# Patient Record
Sex: FEMALE | Race: WHITE | Employment: OTHER | URBAN - METROPOLITAN AREA
[De-identification: names, ages, dates, MRNs, and addresses within clinical notes are randomized per-mention and may not be internally consistent; named-entity substitution may affect disease eponyms.]

---

## 2017-05-08 ENCOUNTER — TELEPHONE (OUTPATIENT)
Dept: BONE DENSITY | Facility: CLINIC | Age: 67
End: 2017-05-08

## 2017-05-15 ENCOUNTER — OFFICE VISIT (OUTPATIENT)
Dept: SLEEP MEDICINE | Facility: CLINIC | Age: 67
End: 2017-05-15
Payer: MEDICARE

## 2017-05-15 VITALS
SYSTOLIC BLOOD PRESSURE: 128 MMHG | BODY MASS INDEX: 18.78 KG/M2 | HEART RATE: 50 BPM | WEIGHT: 110 LBS | OXYGEN SATURATION: 100 % | TEMPERATURE: 98.6 F | DIASTOLIC BLOOD PRESSURE: 73 MMHG | HEIGHT: 64 IN

## 2017-05-15 DIAGNOSIS — G47.52 RBD (REM BEHAVIORAL DISORDER): Primary | ICD-10-CM

## 2017-05-15 PROCEDURE — 99213 OFFICE O/P EST LOW 20 MIN: CPT | Performed by: PSYCHIATRY & NEUROLOGY

## 2017-05-15 NOTE — PATIENT INSTRUCTIONS

## 2017-05-15 NOTE — NURSING NOTE
"Chief Complaint   Patient presents with     Sleep Problem     sleep return       Initial /73  Pulse 50  Temp 98.6  F (37  C) (Oral)  Ht 1.626 m (5' 4\")  Wt 49.9 kg (110 lb)  SpO2 100%  BMI 18.88 kg/m2 Estimated body mass index is 18.88 kg/(m^2) as calculated from the following:    Height as of this encounter: 1.626 m (5' 4\").    Weight as of this encounter: 49.9 kg (110 lb).  Medication Reconciliation: complete     ESS 2    Kristin Sullivan  Clinical Coordinator        "

## 2017-05-15 NOTE — PROGRESS NOTES
FOLLOWUP NOTE      REQUESTING PHYSICIAN:  None stated.        Nerissa Sun is 67 years old.  She has a history of REM sleep behavior disorder.  She is here for followup.  She continues to have only slight symptoms of hypopnea and constipation.  She has no difficulty with tremor, no handwriting changes, no balance issues.      In regards to her dream enactment behavior, 10 mg of melatonin has very nicely prevented her from having any significant dream enactment behavior that could be potentially injurious.  She recalls 1 episode where she awoke and she was dreaming about getting out of bed, but she did not do that.      On examination today, she demonstrated no parkinsonian features.  Her motor tone is normal.  Gait, including arm swing, stride and turn are normal and no motor tone abnormalities or tremor.      ASSESSMENT:  Ms. Sun is 67 years old.  She has a history of REM sleep behavior disorder, very likely triggered at least in part by antidepressant medications.  I think her likelihood of progressing to neurodegenerative syndrome in the near term is extremely unlikely based upon her normal neurologic exam and the association with her antidepressant medications but we will keep a close eye on her.  I have asked her to give us a call if she has any problems, she has been quite generous and has volunteered to participate in research trials for REM sleep behavior disorder and we will have her on our call list.  She will continue on the melatonin.      She is excited for this summer to going down to [facility] to visit with her 2 new grandchildren.        It is a great privilege being asked to participate in her care.  Fifteen minutes were spent with the patient today, greater than 50% of the time in counseling and coordination of care.      The patient should not operate a motor vehicle if she is tired or sleepy.         EULALIO ELIAS MD             D: 05/15/2017 09:54   T: 05/15/2017 10:19    MT: fatmata      Name:     FAUSTINO MARIA   MRN:      [id number]        Account:      [id number]   :      1950           Visit Date:   05/15/2017      Document: [id number]

## 2017-05-15 NOTE — MR AVS SNAPSHOT
After Visit Summary   5/15/2017    Nerissa Sun    MRN: [id number]           Patient Information     Date Of Birth          1950        Visit Information        Provider Department      5/15/2017 9:30 AM Deny Paulino MD [facility]        Today's Diagnoses     RBD (REM behavioral disorder)    -  1       Follow-ups after your visit        Your next 10 appointments already scheduled     Jun 26, 2017  8:30 AM CDT   MA SCREENING DIGITAL BILATERAL with [id number]   [facility] ([facility])    [address] [facility], [address]  [facility] [id number] [phone]           Do not use any powder, lotion or deodorant under your arms or on your breast. If you do, we will ask you to remove it before your exam.  Wear comfortable, two-piece clothing.  If you have any allergies, tell your care team.  Bring any previous mammograms from other facilities or have them mailed to the breast center. This mammogram location, [facility], now offers 3D mammography. It doesn't replace a screening mammogram and can be done with a regular screening mammogram. It is optional and not all insurances will pay for it. 3D mammography is a special kind of mammogram that produces a three-dimensional image of the breast by using low dose-xrays. 3D allows the radiologist to see the breast tissue differently from 2D, which reduces the chance of repeat testing due to overlapping breast tissue. If you are interested in have a 3D mammogram, please check with your insurance before you arrive for your exam. On the day of your exam you will be asked if you would like 3D imaging.            Jun 26, 2017  9:30 AM CDT   DX HIP/PELVIS/SPINE with RIDX1   [facility]    [address] [place]  [address]  [place] [address]              Please do not take any of the following 48 hours prior to your exam:  vitamins, calcium tablets, antacids.              Who to contact     If you have questions or need follow up information about today's clinic visit or your schedule please contact [facility] directly at [phone].  Normal or non-critical lab and imaging results will be communicated to you by MyChart, letter or phone within 4 business days after the clinic has received the results. If you do not hear from us within 7 days, please contact the clinic through MyChart or phone. If you have a critical or abnormal lab result, we will notify you by phone as soon as possible.  Submit refill requests through [employer] or call your pharmacy and they will forward the refill request to us. Please allow 3 business days for your refill to be completed.          Additional Information About Your Visit        [employer] Information     [employer] gives you secure access to your electronic health record. If you see a primary care provider, you can also send messages to your care team and make appointments. If you have questions, please call your primary care clinic.  If you do not have a primary care provider, please call [phone] and they will assist you.        Care EveryWhere ID     This is your Care EveryWhere ID. This could be used by other organizations to access your [place] medical records  [id number]         Blood Pressure from Last 3 Encounters:   06/21/16 104/63   01/12/15 116/72   01/09/15 117/79    Weight from Last 3 Encounters:   06/21/16 48.1 kg (106 lb)   01/12/15 48.5 kg (106 lb 14.4 oz)   11/25/14 48.1 kg (106 lb)              Today, you had the following     No orders found for display       Primary Care Provider Office Phone # Fax #    Savi You [phone]       [facility] [address]  [facility] [address]        Thank you!     Thank you for choosing [facility]  for your care. Our goal is always to provide you with  excellent care. Hearing back from our patients is one way we can continue to improve our services. Please take a few minutes to complete the written survey that you may receive in the mail after your visit with us. Thank you!             Your Updated Medication List - Protect others around you: Learn how to safely use, store and throw away your medicines at www.disposemymeds.org.          This list is accurate as of: 5/15/17  9:45 AM.  Always use your most recent med list.                   Brand Name Dispense Instructions for use    Acidophilus Tabs      Take 2 capsules by mouth daily. 10 billion in 2 capsules       buPROPion 150 MG 24 hr tablet    WELLBUTRIN XL     Take 450 mg by mouth every morning.       calcium 1200 [id number] MG-UNIT Chew     30 tablet    Take by mouth 2 times daily Liquid- with magnesium       cholecalciferol 5000 UNITS Caps capsule    vitamin D3     Take 1 capsule (5,000 Units) by mouth daily       famotidine 40 MG tablet    PEPCID    30 tablet    Take 1 tablet (40 mg) by mouth At Bedtime       levothyroxine 112 MCG tablet    SYNTHROID/LEVOTHROID     Take  by mouth daily.       LIOTHYRONINE SODIUM PO      Take 5 mcg by mouth 2 times daily.       LORazepam 1 MG tablet    ATIVAN    60 tablet    Take 1 tablet (1 mg) by mouth every 8 hours as needed for anxiety       magnesium citrate solution     296 mL    Drink 150mL PO once. If no BM in 8 hours, drink another 150mL PO       MELATONIN      1 mg At Bedtime       MIRALAX powder   Generic drug:  polyethylene glycol     510 g    Take 17 g by mouth daily       OMEGA-3 FISH OIL PO          sennosides 8.6 MG tablet    SENOKOT     Take 1 tablet by mouth daily

## 2017-06-26 ENCOUNTER — HOSPITAL ENCOUNTER (OUTPATIENT)
Dept: MAMMOGRAPHY | Facility: CLINIC | Age: 67
Discharge: HOME | End: 2017-06-26
Attending: INTERNAL MEDICINE | Admitting: INTERNAL MEDICINE
Payer: MEDICARE

## 2017-06-26 DIAGNOSIS — Z12.31 ENCOUNTER FOR SCREENING MAMMOGRAM FOR HIGH-RISK PATIENT: ICD-10-CM

## 2017-06-26 PROCEDURE — 77063 BREAST TOMOSYNTHESIS BI: CPT

## 2017-06-26 PROCEDURE — G0202 SCR MAMMO BI INCL CAD: HCPCS

## 2017-07-19 ENCOUNTER — RADIANT APPOINTMENT (OUTPATIENT)
Dept: BONE DENSITY | Facility: CLINIC | Age: 67
End: 2017-07-19
Payer: MEDICARE

## 2017-07-19 DIAGNOSIS — M81.0 OSTEOPOROSIS: ICD-10-CM

## 2017-07-19 DIAGNOSIS — Z78.0 POST-MENOPAUSE: ICD-10-CM

## 2017-07-19 PROCEDURE — 77080 DXA BONE DENSITY AXIAL: CPT | Performed by: INTERNAL MEDICINE

## 2017-08-01 ENCOUNTER — TELEPHONE (OUTPATIENT)
Dept: BONE DENSITY | Facility: CLINIC | Age: 67
End: 2017-08-01

## 2017-08-28 ENCOUNTER — TELEPHONE (OUTPATIENT)
Dept: SLEEP MEDICINE | Facility: CLINIC | Age: 67
End: 2017-08-28

## 2017-08-28 NOTE — TELEPHONE ENCOUNTER
Patient would like to speak with you, she live sin [place] and the soonest she can see you is the end of October. She has fallen out of bed twice, to the point they had to check if she had broken bones. She is taking 10 mg of melatonin every night but is very concerned about her situation. She hasn't fallen out of bed for years. She would like you to give her a call on her mobile number.    Yesica Moon

## 2017-08-28 NOTE — TELEPHONE ENCOUNTER
I called the patient twice and got no response.  Advised to go up to 15mg of melatonin and [facility].

## 2017-12-11 ENCOUNTER — HOSPITAL ENCOUNTER (EMERGENCY)
Facility: CLINIC | Age: 67
Discharge: HOME | End: 2017-12-11
Attending: EMERGENCY MEDICINE | Admitting: EMERGENCY MEDICINE
Payer: MEDICARE

## 2017-12-11 VITALS
RESPIRATION RATE: 16 BRPM | WEIGHT: 110 LBS | BODY MASS INDEX: 18.88 KG/M2 | HEART RATE: 75 BPM | OXYGEN SATURATION: 98 % | TEMPERATURE: 98.4 F | DIASTOLIC BLOOD PRESSURE: 94 MMHG | SYSTOLIC BLOOD PRESSURE: 160 MMHG

## 2017-12-11 DIAGNOSIS — F41.9 ANXIETY: ICD-10-CM

## 2017-12-11 DIAGNOSIS — R42 DIZZINESS: ICD-10-CM

## 2017-12-11 LAB
ANION GAP SERPL CALCULATED.3IONS-SCNC: 7 MMOL/L (ref 3–14)
BUN SERPL-MCNC: 28 MG/DL (ref 7–30)
CALCIUM SERPL-MCNC: 9.6 MG/DL (ref 8.5–10.1)
CHLORIDE SERPL-SCNC: 103 MMOL/L (ref 94–109)
CO2 SERPL-SCNC: 28 MMOL/L (ref 20–32)
CREAT SERPL-MCNC: 1.04 MG/DL (ref 0.52–1.04)
ERYTHROCYTE [DISTWIDTH] IN BLOOD BY AUTOMATED COUNT: 12.6 % (ref 10–15)
GFR SERPL CREATININE-BSD FRML MDRD: 53 ML/MIN/1.7M2
GLUCOSE BLDC GLUCOMTR-MCNC: 91 MG/DL (ref 70–99)
GLUCOSE SERPL-MCNC: 97 MG/DL (ref 70–99)
HCT VFR BLD AUTO: 41.6 % (ref 35–47)
HGB BLD-MCNC: 14 G/DL (ref 11.7–15.7)
MCH RBC QN AUTO: 33.9 PG (ref 26.5–33)
MCHC RBC AUTO-ENTMCNC: 33.7 G/DL (ref 31.5–36.5)
MCV RBC AUTO: 101 FL (ref 78–100)
PLATELET # BLD AUTO: 153 10E9/L (ref 150–450)
POTASSIUM SERPL-SCNC: 3.4 MMOL/L (ref 3.4–5.3)
RBC # BLD AUTO: 4.13 10E12/L (ref 3.8–5.2)
SODIUM SERPL-SCNC: 138 MMOL/L (ref 133–144)
T4 FREE SERPL-MCNC: 0.93 NG/DL (ref 0.76–1.46)
TSH SERPL DL<=0.005 MIU/L-ACNC: 0.39 MU/L (ref 0.4–4)
WBC # BLD AUTO: 5.4 10E9/L (ref 4–11)

## 2017-12-11 PROCEDURE — 93005 ELECTROCARDIOGRAM TRACING: CPT

## 2017-12-11 PROCEDURE — 00000146 ZZHCL STATISTIC GLUCOSE BY METER IP

## 2017-12-11 PROCEDURE — 85027 COMPLETE CBC AUTOMATED: CPT | Performed by: EMERGENCY MEDICINE

## 2017-12-11 PROCEDURE — 99284 EMERGENCY DEPT VISIT MOD MDM: CPT

## 2017-12-11 PROCEDURE — 84443 ASSAY THYROID STIM HORMONE: CPT | Performed by: EMERGENCY MEDICINE

## 2017-12-11 PROCEDURE — 80048 BASIC METABOLIC PNL TOTAL CA: CPT | Performed by: EMERGENCY MEDICINE

## 2017-12-11 PROCEDURE — 84439 ASSAY OF FREE THYROXINE: CPT | Performed by: EMERGENCY MEDICINE

## 2017-12-11 ASSESSMENT — ENCOUNTER SYMPTOMS
NERVOUS/ANXIOUS: 1
NAUSEA: 1
TREMORS: 1
ABDOMINAL PAIN: 0

## 2017-12-11 NOTE — ED PROVIDER NOTES
"  History     Chief Complaint:  Tremors, Confusion    HPI   Nerissa Sun is a 67 year old female with a history of anxiety who presents with bilateral hand tremors and confusion. The patient reports having a history of episodes of nausea, bilateral hand shaking, head pulsing, and an inability to \"think straight\"; she states having 4 of these episodes in the last 2 months. She is from [place] and saw a neurologist who attributed those episodes to anxiety. Today, she reports going to mental health therapy this morning and taking a wrong turn. She was able to navigate back, go to therapy, and then on her way missed her exit for [address]. At this point, around 1330, the patient noted bilateral hand shaking and head pulsing, which is normal to her previous history. She states that she drove to urgent care for further evaluation and presents to the ED for stroke concerns. She notes no pain and that her hand tremors have subsided. The patient reports recently starting Lyrica. She additionally mentions that her blood pressure is normally very low and is concerned about her high blood pressure here in the ED.    Allergies:  Melatonin  Latex      Medications:    Pepcid  Ativan  Levothyroxine  Wellbutrin  Liothyronine   Lyrica    Past Medical History:    Basal cell carcinoma of face  Chronic pain  Dysthymia  ETOH abuse  Fibromyalgia  GERD  Hypothyroid  PONV  Spondylitis    Past Surgical History:    Appendectomy   Carpal tunnel release bilateral  Colonoscopy  DNC  Left wrist fracture  Tubal pregnancy  Hysterectomy  Tympanomastoidectomy  Rotator cuff repair  Tympanoplasty    Family History:    Cancer - mother  MI - mother  Thyroid - maternal grandmother, maternal aunt, brother, mother  Alcohol/Drug - father  Cancer - maternal grandfather, mother, maternal grandmother  Cerebrovascular - maternal aunt    Social History:  Smoking status: former  Alcohol use: no   PCP: Savi You   Patient presents alone.   Marital Status:  "       Review of Systems   Cardiovascular: Negative for chest pain.   Gastrointestinal: Positive for nausea. Negative for abdominal pain.   Neurological: Positive for tremors.   Psychiatric/Behavioral: The patient is nervous/anxious.    All other systems reviewed and are negative.    Physical Exam     Patient Vitals for the past 24 hrs:   BP Temp Temp src Pulse Heart Rate Resp SpO2 Weight   12/11/17 1630 144/84 - - - 61 - 100 % -   12/11/17 1615 147/79 - - - 60 - 100 % -   12/11/17 1600 158/84 - - - 63 - 100 % -   12/11/17 1554 - 98.4  F (36.9  C) Oral - - - 100 % 49.9 kg (110 lb)   12/11/17 1549 (!) 160/95 - - 75 - 16 - -        Physical Exam    General:   Pleasant, age appropriate.  HEENT:    Oropharynx is moist, without lesions or trismus.  Eyes:    Conjunctiva normal     PERRL     EOMs and visual fields intact.  Neck:    Supple, no meningismus.     CV:     Regular rate and rhythm.      No murmurs, rubs or gallops.       No JVD or unilateral leg swelling.       2+ radial pulses bilateral.       No lower extremity edema.  PULM:    Clear to auscultation bilateral.       No respiratory distress.      Good air exchange.     No rales or wheezing.     No stridor.  ABD:    Soft, non-tender, non-distended.       No pulsatile masses.       No rebound, guarding or rigidity.  MSK:     No gross deformity to all four extremities.   LYMPH:   No cervical lymphadenopathy.  NEURO:   Alert & oriented x 3.      CN II-XII intact, speech is clear with no aphasia.       Finger to nose within normal limits.  No pronator drift.       Strength is 5/5 in all 4 extremities.  Sensation is intact.       Normal muscular tone, no tremor.     Good muscle tone, no atrophy.  Skin:    Warm, dry and intact.    Psych:    Mood is good and affect is appropriate.     Mildly anxious        Emergency Department Course   ECG (15:43):  Rate 69 bpm. ND interval 130. QT/QTc 382/409.   Normal sinus rhythm. Possible left atrial enlargement. Borderline  ECG.  Interpreted at 1554 by Reynaldo Shah MD.      Laboratory:  CBC: WNL (WBC 5.4, HGB 14.0, )   BMP: GFR 53 (L), o/w WNL (Creatinine 1.04)  TSH with free T4 reflex: 0.39 (L)  T4 Free: 0.93  Glucose: 91    Emergency Department Course:  Past medical records, nursing notes, and vitals reviewed.  1546: I performed an exam of the patient and obtained history, as documented above. GCS 15.   IV inserted and blood drawn.   1703: I rechecked the patient. Findings and plan explained to the Patient. Patient discharged home with instructions regarding supportive care, medications, and reasons to return. The importance of close follow-up was reviewed.      Impression & Plan      Medical Decision Making:  Nerissa Sun is a 67 year old female presenting to the ED with transient confusion and bilateral upper extremity tremors. History and evaluation is not consistent with any acute intracranial process, especially given her bilateral, symmetric upper extremity symptoms. She has no focal neurological deficits on examination. Basic laboratory studies are unremarkable. She does have a history of anxiety and evaluation by neurologist prior to today that attributed her symptoms to anxiety. I feel that her recurrent symptoms are most consistent with anxiety. There is no suggestion of TIA or acute intracranial event. It less likely represents transient arrhythmia or endocrinopathy. Patient is safe for discharge home with close follow up with primary care physician.    Diagnosis:    ICD-10-CM    1. Dizziness R42    2. Anxiety F41.9        Disposition:  discharged to home    Aimee Hickman  12/11/2017   [facility] EMERGENCY DEPARTMENT  I, Aimee Hickman, am serving as a scribe at 3:46 PM on 12/11/2017 to document services personally performed by Reynaldo Shah MD based on my observations and the provider's statements to me.        Reynaldo Shah MD  12/11/17 [id number]

## 2017-12-11 NOTE — DISCHARGE INSTRUCTIONS
Discharge Instructions  Dizziness (Lightheaded)  Today you were seen for dizziness.  Dizziness can be caused by many things and it can be very difficult to determine the cause of dizziness.  At this time, your provider has found no signs that your dizziness is due to a serious or life-threatening condition. However, sometimes there is a serious problem that does not show up right away, and it is important for you to follow up with your regular provider as instructed.  Generally, every Emergency Department visit should have a follow-up clinic visit with either a primary or a specialty clinic/provider. Please follow-up as instructed by your emergency provider today.      Return to the Emergency Department if:      You pass out (fainting or falling out), especially during exercise.      You develop chest pain, chest pressure or difficulty breathing.    Your feel an irregular heartbeat.    You have excessive vaginal bleeding, or blood in your stool or vomit (throw up).    You have a high fever.    Your symptoms get worse or more frequent.    If when you begin to feel dizzy or lightheaded, it is important to sit down or lay down immediately to prevent injury from falling.  If you were given a prescription for medicine here today, be sure to read all of the information (including the package insert) that comes with your prescription.  This will include important information about the medicine, its side effects, and any warnings that you need to know about.  The pharmacist who fills the prescription can provide more information and answer questions you may have about the medicine.  If you have questions or concerns that the pharmacist cannot address, please call or return to the Emergency Department.   Remember that you can always come back to the Emergency Department if you are not able to see your regular provider in the amount of time listed above, if you get any new symptoms, or if there is anything that worries  you.

## 2017-12-11 NOTE — ED AVS SNAPSHOT
[facility] Emergency Department    [address]    [facility] [address]    Phone:  [phone]    Fax:  [phone]                                       Nerissa Sun   MRN: [id number]    Department:  [facility] Emergency Department   Date of Visit:  12/11/2017           After Visit Summary Signature Page     I have received my discharge instructions, and my questions have been answered. I have discussed any challenges I see with this plan with the nurse or doctor.    ..........................................................................................................................................  Patient/Patient Representative Signature      ..........................................................................................................................................  Patient Representative Print Name and Relationship to Patient    ..................................................               ................................................  Date                                            Time    ..........................................................................................................................................  Reviewed by Signature/Title    ...................................................              ..............................................  Date                                                            Time

## 2017-12-11 NOTE — ED AVS SNAPSHOT
[facility] Emergency Department    [address] [place] [address]    Phone:  [phone]    Fax:  [phone]                                       Nerissa Sun   MRN: [id number]    Department:  [facility] Emergency Department   Date of Visit:  12/11/2017           Patient Information     Date Of Birth          1950        Your diagnoses for this visit were:     Dizziness     Anxiety        You were seen by Reynaldo Shah MD.      Follow-up Information     Follow up with Cyndi Rodriguez NP.    Specialty:  Nurse Practitioner    Contact information:    [facility]  [address]  [place] [id number]  [phone]          Follow up with Cyndi Rodriguez NP In 3 days.    Specialty:  Nurse Practitioner    Contact information:    [facility]  [address] [place] [id number]  [phone]          Discharge Instructions       Discharge Instructions  Dizziness (Lightheaded)  Today you were seen for dizziness.  Dizziness can be caused by many things and it can be very difficult to determine the cause of dizziness.  At this time, your provider has found no signs that your dizziness is due to a serious or life-threatening condition. However, sometimes there is a serious problem that does not show up right away, and it is important for you to follow up with your regular provider as instructed.  Generally, every Emergency Department visit should have a follow-up clinic visit with either a primary or a specialty clinic/provider. Please follow-up as instructed by your emergency provider today.      Return to the Emergency Department if:      You pass out (fainting or falling out), especially during exercise.      You develop chest pain, chest pressure or difficulty breathing.    Your feel an irregular heartbeat.    You have excessive vaginal bleeding, or blood in your stool or vomit (throw up).    You have a high fever.    Your symptoms get worse or  more frequent.    If when you begin to feel dizzy or lightheaded, it is important to sit down or lay down immediately to prevent injury from falling.  If you were given a prescription for medicine here today, be sure to read all of the information (including the package insert) that comes with your prescription.  This will include important information about the medicine, its side effects, and any warnings that you need to know about.  The pharmacist who fills the prescription can provide more information and answer questions you may have about the medicine.  If you have questions or concerns that the pharmacist cannot address, please call or return to the Emergency Department.   Remember that you can always come back to the Emergency Department if you are not able to see your regular provider in the amount of time listed above, if you get any new symptoms, or if there is anything that worries you.      Discharge References/Attachments     ANXIETY DISORDERS, UNDERSTANDING (ENGLISH)      24 Hour Appointment Hotline       To make an appointment at any [facility], call [phone] ([phone]). If you don't have a family doctor or clinic, we will help you find one. [facility] are conveniently located to serve the needs of you and your family.             Review of your medicines      Our records show that you are taking the medicines listed below. If these are incorrect, please call your family doctor or clinic.        Dose / Directions Last dose taken    Acidophilus Tabs   Dose:  2 capsule        Take 2 capsules by mouth daily. 10 billion in 2 capsules   Refills:  0        buPROPion 150 MG 24 hr tablet   Commonly known as:  WELLBUTRIN XL   Dose:  450 mg        Take 450 mg by mouth every morning.   Refills:  0        calcium 1200 8252-3172 MG-UNIT Chew   Quantity:  30 tablet        Take by mouth 2 times daily Liquid- with magnesium   Refills:  0        cholecalciferol 5000 UNITS Caps capsule    Commonly known as:  vitamin D3   Dose:  5000 Units        Take 1 capsule (5,000 Units) by mouth daily   Refills:  0        famotidine 40 MG tablet   Commonly known as:  PEPCID   Dose:  40 mg   Quantity:  30 tablet        Take 1 tablet (40 mg) by mouth At Bedtime   Refills:  1        levothyroxine 112 MCG tablet   Commonly known as:  SYNTHROID/LEVOTHROID        Take  by mouth daily.   Refills:  0        LIOTHYRONINE SODIUM PO   Dose:  5 mcg        Take 5 mcg by mouth 2 times daily.   Refills:  0        LORazepam 1 MG tablet   Commonly known as:  ATIVAN   Dose:  1 mg   Quantity:  60 tablet        Take 1 tablet (1 mg) by mouth every 8 hours as needed for anxiety   Refills:  0        LYRICA PO        Refills:  0        magnesium citrate solution   Quantity:  296 mL        Drink 150mL PO once. If no BM in 8 hours, drink another 150mL PO   Refills:  0        MELATONIN   Dose:  1 mg        1 mg At Bedtime   Refills:  0        MIRALAX powder   Dose:  1 capful   Quantity:  510 g   Generic drug:  polyethylene glycol        Take 17 g by mouth daily   Refills:  1        OMEGA-3 FISH OIL PO        Refills:  0        sennosides 8.6 MG tablet   Commonly known as:  SENOKOT   Dose:  1 tablet        Take 1 tablet by mouth daily   Refills:  0                Procedures and tests performed during your visit     Basic metabolic panel (BMP)    CBC (platelets, no diff)    EKG 12 lead    Glucose by meter    Peripheral IV catheter    T4 free    TSH with free T4 reflex      Orders Needing Specimen Collection     None      Pending Results     Date and Time Order Name Status Description    12/11/2017 1609 EKG 12 lead Preliminary             Pending Culture Results     No orders found from 12/9/2017 to 12/12/2017.            Pending Results Instructions     If you had any lab results that were not finalized at the time of your Discharge, you can call the ED Lab Result RN at [phone]. You will be contacted by this team for any positive Lab  results or changes in treatment. The nurses are available 7 days a week from 10A to 6:30P.  You can leave a message 24 hours per day and they will return your call.        Test Results From Your Hospital Stay        12/11/2017  4:04 PM      Component Results     Component Value Ref Range & Units Status    WBC 5.4 4.0 - 11.0 10e9/L Final    RBC Count 4.13 3.8 - 5.2 10e12/L Final    Hemoglobin 14.0 11.7 - 15.7 g/dL Final    Hematocrit 41.6 35.0 - 47.0 % Final     (H) 78 - 100 fl Final    MCH 33.9 (H) 26.5 - 33.0 pg Final    MCHC 33.7 31.5 - 36.5 g/dL Final    RDW 12.6 10.0 - 15.0 % Final    Platelet Count 153 150 - 450 10e9/L Final         12/11/2017  4:26 PM      Component Results     Component Value Ref Range & Units Status    Sodium 138 133 - 144 mmol/L Final    Potassium 3.4 3.4 - 5.3 mmol/L Final    Chloride 103 94 - 109 mmol/L Final    Carbon Dioxide 28 20 - 32 mmol/L Final    Anion Gap 7 3 - 14 mmol/L Final    Glucose 97 70 - 99 mg/dL Final    Urea Nitrogen 28 7 - 30 mg/dL Final    Creatinine 1.04 0.52 - 1.04 mg/dL Final    GFR Estimate 53 (L) >60 mL/min/1.7m2 Final    Non  GFR Calc    GFR Estimate If Black 64 >60 mL/min/1.7m2 Final    African American GFR Calc    Calcium 9.6 8.5 - 10.1 mg/dL Final         12/11/2017  4:30 PM      Component Results     Component Value Ref Range & Units Status    TSH 0.39 (L) 0.40 - 4.00 mU/L Final         12/11/2017  3:57 PM      Component Results     Component Value Ref Range & Units Status    Glucose 91 70 - 99 mg/dL Final         12/11/2017  4:49 PM      Component Results     Component Value Ref Range & Units Status    T4 Free 0.93 0.76 - 1.46 ng/dL Final                Clinical Quality Measure: Blood Pressure Screening     Your blood pressure was checked while you were in the emergency department today. The last reading we obtained was  BP: 144/84 . Please read the guidelines below about what these numbers mean and what you should do about them.  If  your systolic blood pressure (the top number) is less than 120 and your diastolic blood pressure (the bottom number) is less than 80, then your blood pressure is normal. There is nothing more that you need to do about it.  If your systolic blood pressure (the top number) is 120-139 or your diastolic blood pressure (the bottom number) is 80-89, your blood pressure may be higher than it should be. You should have your blood pressure rechecked within a year by a primary care provider.  If your systolic blood pressure (the top number) is 140 or greater or your diastolic blood pressure (the bottom number) is 90 or greater, you may have high blood pressure. High blood pressure is treatable, but if left untreated over time it can put you at risk for heart attack, stroke, or kidney failure. You should have your blood pressure rechecked by a primary care provider within the next 4 weeks.  If your provider in the emergency department today gave you specific instructions to follow-up with your doctor or provider even sooner than that, you should follow that instruction and not wait for up to 4 weeks for your follow-up visit.        Thank you for choosing [facility]       Thank you for choosing [facility] for your care. Our goal is always to provide you with excellent care. Hearing back from our patients is one way we can continue to improve our services. Please take a few minutes to complete the written survey that you may receive in the mail after you visit with us. Thank you!        [employer] Information     [employer] gives you secure access to your electronic health record. If you see a primary care provider, you can also send messages to your care team and make appointments. If you have questions, please call your primary care clinic.  If you do not have a primary care provider, please call [phone] and they will assist you.        Care EveryWhere ID     This is your Care EveryWhere ID. This could be used by other  organizations to access your [place] medical records  [id number]        Equal Access to Services     DWAYNE WILLIS : Gabriel Gilman, sakshi sarah, lial thayer. So [facility] [phone].    ATENCIÓN: Si habla español, tiene a jensen disposición servicios gratuitos de asistencia lingüística. Llame al [phone].    We comply with applicable federal civil rights laws and [place] laws. We do not discriminate on the basis of race, color, national origin, age, disability, sex, sexual orientation, or gender identity.            After Visit Summary       This is your record. Keep this with you and show to your community pharmacist(s) and doctor(s) at your next visit.

## 2017-12-12 ENCOUNTER — TELEPHONE (OUTPATIENT)
Dept: SLEEP MEDICINE | Facility: CLINIC | Age: 67
End: 2017-12-12

## 2017-12-12 LAB — INTERPRETATION ECG - MUSE: NORMAL

## 2017-12-12 NOTE — TELEPHONE ENCOUNTER
Left message for patient to contact Sleep Clinic if she would like to schedule an appointment with Dr. Paulino. He does have an appointment opening for 12/13/17 at 9am.

## 2017-12-12 NOTE — TELEPHONE ENCOUNTER
Patient is in town this week and is looking to see if patient can be seen by Dr. Paulino sometime this week and if not, if patient can have a phone consultation. There is a 9:00 appt available tomorrow 12/13/2017; however per patient has another appt tomorrow. The patient was last seen by Dr. Paulino 11/24/2016. Please contact the patient in regards to scheduling appointment.    Outreach ,  Loreta Correia

## 2017-12-13 ENCOUNTER — OFFICE VISIT (OUTPATIENT)
Dept: SLEEP MEDICINE | Facility: CLINIC | Age: 67
End: 2017-12-13
Payer: MEDICARE

## 2017-12-13 VITALS
HEART RATE: 67 BPM | DIASTOLIC BLOOD PRESSURE: 66 MMHG | WEIGHT: 102 LBS | SYSTOLIC BLOOD PRESSURE: 104 MMHG | HEIGHT: 65 IN | BODY MASS INDEX: 17 KG/M2 | RESPIRATION RATE: 14 BRPM | OXYGEN SATURATION: 98 %

## 2017-12-13 DIAGNOSIS — G47.52 RBD (REM BEHAVIORAL DISORDER): Primary | ICD-10-CM

## 2017-12-13 PROCEDURE — 99214 OFFICE O/P EST MOD 30 MIN: CPT | Performed by: PSYCHIATRY & NEUROLOGY

## 2017-12-13 RX ORDER — DIAZEPAM 5 MG
5 TABLET ORAL
COMMUNITY
Start: 2017-01-29 | End: 2017-12-22

## 2017-12-13 RX ORDER — MULTIVIT WITH MINERALS/LUTEIN
1000 TABLET ORAL DAILY
COMMUNITY
Start: 2017-01-29

## 2017-12-13 RX ORDER — SUCRALFATE 1 G/1
1 TABLET ORAL 3 TIMES DAILY
COMMUNITY

## 2017-12-13 NOTE — PATIENT INSTRUCTIONS
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
## 2017-12-13 NOTE — MR AVS SNAPSHOT
After Visit Summary   12/13/2017    Nerissa Sun    MRN: [id number]           Patient Information     Date Of Birth          1950        Visit Information        Provider Department      12/13/2017 9:00 AM Deny Paulino MD [facility] Instructions      Your BMI is Body mass index is 17.24 kg/(m^2).  Weight management is a personal decision.  If you are interested in exploring weight loss strategies, the following discussion covers the approaches that may be successful. Body mass index (BMI) is one way to tell whether you are at a healthy weight, overweight, or obese. It measures your weight in relation to your height.  A BMI of 18.5 to 24.9 is in the healthy range. A person with a BMI of 25 to 29.9 is considered overweight, and someone with a BMI of 30 or greater is considered obese. More than two-thirds of American adults are considered overweight or obese.  Being overweight or obese increases the risk for further weight gain. Excess weight may lead to heart disease and diabetes.  Creating and following plans for healthy eating and physical activity may help you improve your health.  Weight control is part of healthy lifestyle and includes exercise, emotional health, and healthy eating habits. Careful eating habits lifelong are the mainstay of weight control. Though there are significant health benefits from weight loss, long-term weight loss with diet alone may be very difficult to achieve- studies show long-term success with dietary management in less than 10% of people. Attaining a healthy weight may be especially difficult to achieve in those with severe obesity. In some cases, medications, devices and surgical management might be considered.  What can you do?  If you are overweight or obese and are interested in methods for weight loss, you should discuss this with your provider.     Consider reducing daily calorie intake by 500 calories.     Keep  a food journal.     Avoiding skipping meals, consider cutting portions instead.    Diet combined with exercise helps maintain muscle while optimizing fat loss. Strength training is particularly important for building and maintaining muscle mass. Exercise helps reduce stress, increase energy, and improves fitness. Increasing exercise without diet control, however, may not burn enough calories to loose weight.       Start walking three days a week 10-20 minutes at a time    Work towards walking thirty minutes five days a week     Eventually, increase the speed of your walking for 1-2 minutes at time    In addition, we recommend that you review healthy lifestyles and methods for weight loss available through the National Institutes of Health patient information sites:  http://win.niddk.nih.gov/publications/index.htm    And look into health and wellness programs that may be available through your health insurance provider, employer, local community center, or jorge club.                  Follow-ups after your visit        Your next 10 appointments already scheduled     Feb 14, 2018 11:00 AM CST   Return Sleep Patient with Deny Paulino MD   [facility] ([facility]    [address] [place] [id number] [phone]              Who to contact     If you have questions or need follow up information about today's clinic visit or your schedule please contact [facility] directly at [phone].  Normal or non-critical lab and imaging results will be communicated to you by MyChart, letter or phone within 4 business days after the clinic has received the results. If you do not hear from us within 7 days, please contact the clinic through MyChart or phone. If you have a critical or abnormal lab result, we will notify you by phone as soon as possible.  Submit refill requests through [employer] or call your pharmacy and they will forward the  "refill request to us. Please allow 3 business days for your refill to be completed.          Additional Information About Your Visit        [employer] Information     [employer] gives you secure access to your electronic health record. If you see a primary care provider, you can also send messages to your care team and make appointments. If you have questions, please call your primary care clinic.  If you do not have a primary care provider, please call [phone] and they will assist you.        Care EveryWhere ID     This is your Care EveryWhere ID. This could be used by other organizations to access your [place] medical records  [id number]        Your Vitals Were     Pulse Respirations Height Pulse Oximetry BMI (Body Mass Index)       67 14 1.638 m (5' 4.5\") 98% 17.24 kg/m2        Blood Pressure from Last 3 Encounters:   12/13/17 104/66   12/11/17 (!) 160/94   05/15/17 128/73    Weight from Last 3 Encounters:   12/13/17 46.3 kg (102 lb)   12/11/17 49.9 kg (110 lb)   05/15/17 49.9 kg (110 lb)              Today, you had the following     No orders found for display         Today's Medication Changes          These changes are accurate as of: 12/13/17  9:49 AM.  If you have any questions, ask your nurse or doctor.               Stop taking these medicines if you haven't already. Please contact your care team if you have questions.     famotidine 40 MG tablet   Commonly known as:  PEPCID           LORazepam 1 MG tablet   Commonly known as:  ATIVAN           LYRICA PO           sennosides 8.6 MG tablet   Commonly known as:  SENOKOT                    Primary Care Provider Office Phone # Fax #    Cyndi F French Rodriguez [phone]       [facility] [address] [facility] [address]        Equal Access to Services     GUILLERMO WILLIS : Gabriel Gilman, sakshi sarah, qainder noguera, lila borrero. So [facility] [phone].    ATENCIÓN: Si tom espmarv, " tiene a jensen disposición servicios gratuitos de asistencia lingüística. Leoncio dunlap [phone].    We comply with applicable federal civil rights laws and [place] laws. We do not discriminate on the basis of race, color, national origin, age, disability, sex, sexual orientation, or gender identity.            Thank you!     Thank you for choosing [facility]  for your care. Our goal is always to provide you with excellent care. Hearing back from our patients is one way we can continue to improve our services. Please take a few minutes to complete the written survey that you may receive in the mail after your visit with us. Thank you!             Your Updated Medication List - Protect others around you: Learn how to safely use, store and throw away your medicines at www.disposemymeds.org.          This list is accurate as of: 12/13/17  9:49 AM.  Always use your most recent med list.                   Brand Name Dispense Instructions for use Diagnosis    Acidophilus Tabs      Take 2 capsules by mouth daily. 10 billion in 2 capsules    Hypothyroid, Osteoporosis with pathological fracture, Osteoporosis, postmenopausal       ascorbic acid 1000 MG Tabs    vitamin C     Take 1,000 mg by mouth        buPROPion 150 MG 24 hr tablet    WELLBUTRIN XL     Take 450 mg by mouth every morning.        calcium 1200 8402-9505 MG-UNIT Chew     30 tablet    Take by mouth 2 times daily Liquid- with magnesium        cholecalciferol 5000 UNITS Caps capsule    vitamin D3     Take 1 capsule (5,000 Units) by mouth daily        COLLAGEN PO           diazepam 5 MG tablet    VALIUM     Take 5 mg by mouth        levothyroxine 112 MCG tablet    SYNTHROID/LEVOTHROID     Take  by mouth daily.        LIOTHYRONINE SODIUM PO      Take 5 mcg by mouth 2 times daily.        magnesium citrate solution     296 mL    Drink 150mL PO once. If no BM in 8 hours, drink another 150mL PO        MELATONIN      1 mg At Bedtime        MIRALAX powder    Generic drug:  polyethylene glycol     510 g    Take 17 g by mouth daily        OMEGA-3 FISH OIL PO           sucralfate 1 GM tablet    CARAFATE     Take by mouth as needed

## 2017-12-13 NOTE — NURSING NOTE
"Chief Complaint   Patient presents with     Sleep Problem     acting out dreams       Initial /66  Pulse 67  Resp 14  Ht 1.638 m (5' 4.5\")  Wt 46.3 kg (102 lb)  SpO2 98%  BMI 17.24 kg/m2 Estimated body mass index is 17.24 kg/(m^2) as calculated from the following:    Height as of this encounter: 1.638 m (5' 4.5\").    Weight as of this encounter: 46.3 kg (102 lb).  Medication Reconciliation: complete   ESS 2  Dafne Regan MA      "

## 2017-12-22 ENCOUNTER — APPOINTMENT (OUTPATIENT)
Dept: CT IMAGING | Facility: CLINIC | Age: 67
End: 2017-12-22
Attending: EMERGENCY MEDICINE
Payer: MEDICARE

## 2017-12-22 ENCOUNTER — TELEPHONE (OUTPATIENT)
Dept: SLEEP MEDICINE | Facility: CLINIC | Age: 67
End: 2017-12-22

## 2017-12-22 ENCOUNTER — HOSPITAL ENCOUNTER (EMERGENCY)
Facility: CLINIC | Age: 67
Discharge: HOME | End: 2017-12-22
Attending: EMERGENCY MEDICINE | Admitting: EMERGENCY MEDICINE
Payer: MEDICARE

## 2017-12-22 ENCOUNTER — TELEPHONE (OUTPATIENT)
Dept: NEUROSURGERY | Facility: CLINIC | Age: 67
End: 2017-12-22

## 2017-12-22 VITALS
OXYGEN SATURATION: 95 % | HEART RATE: 57 BPM | DIASTOLIC BLOOD PRESSURE: 88 MMHG | RESPIRATION RATE: 16 BRPM | SYSTOLIC BLOOD PRESSURE: 158 MMHG | TEMPERATURE: 97.7 F

## 2017-12-22 DIAGNOSIS — G47.52 REM SLEEP BEHAVIOR DISORDER: Primary | ICD-10-CM

## 2017-12-22 DIAGNOSIS — S22.050A CLOSED WEDGE COMPRESSION FRACTURE OF SIXTH THORACIC VERTEBRA, INITIAL ENCOUNTER: ICD-10-CM

## 2017-12-22 DIAGNOSIS — S22.050A: Primary | ICD-10-CM

## 2017-12-22 PROCEDURE — A9270 NON-COVERED ITEM OR SERVICE: HCPCS | Mod: GY | Performed by: EMERGENCY MEDICINE

## 2017-12-22 PROCEDURE — 72128 CT CHEST SPINE W/O DYE: CPT

## 2017-12-22 PROCEDURE — 99284 EMERGENCY DEPT VISIT MOD MDM: CPT | Mod: 25

## 2017-12-22 PROCEDURE — 25000132 ZZH RX MED GY IP 250 OP 250 PS 637: Mod: GY | Performed by: EMERGENCY MEDICINE

## 2017-12-22 RX ORDER — METHOCARBAMOL 750 MG/1
750 TABLET, FILM COATED ORAL ONCE
Status: COMPLETED | OUTPATIENT
Start: 2017-12-22 | End: 2017-12-22

## 2017-12-22 RX ORDER — CLONAZEPAM 0.5 MG/1
0.5 TABLET ORAL AT BEDTIME
Qty: 30 TABLET | Refills: 5 | Status: SHIPPED | OUTPATIENT
Start: 2017-12-22 | End: 2018-03-28

## 2017-12-22 RX ORDER — METHOCARBAMOL 750 MG/1
750 TABLET, FILM COATED ORAL 4 TIMES DAILY PRN
Qty: 20 TABLET | Refills: 0 | Status: SHIPPED | OUTPATIENT
Start: 2017-12-22 | End: 2018-12-05

## 2017-12-22 RX ORDER — TRAMADOL HYDROCHLORIDE 50 MG/1
50 TABLET ORAL ONCE
Status: COMPLETED | OUTPATIENT
Start: 2017-12-22 | End: 2017-12-22

## 2017-12-22 RX ORDER — TRAMADOL HYDROCHLORIDE 50 MG/1
50 TABLET ORAL EVERY 6 HOURS PRN
Qty: 15 TABLET | Refills: 0 | Status: SHIPPED | OUTPATIENT
Start: 2017-12-22 | End: 2018-03-28

## 2017-12-22 RX ADMIN — METHOCARBAMOL 750 MG: 750 TABLET ORAL at 04:28

## 2017-12-22 RX ADMIN — TRAMADOL HYDROCHLORIDE 50 MG: 50 TABLET, COATED ORAL at 03:52

## 2017-12-22 ASSESSMENT — ENCOUNTER SYMPTOMS
HEADACHES: 0
BACK PAIN: 1
NECK PAIN: 0

## 2017-12-22 NOTE — LETTER
December 22, 2017      To Whom It May Concern:      Nerissa Sun was seen in our Emergency Department today, 12/22/17.  She will need to be excused from flying 12/26/2017 secondary to medical reasons.     Sincerely,        Jaylen Latif MD

## 2017-12-22 NOTE — ED AVS SNAPSHOT
[facility] Emergency Department    [address]    [facility] [address]    Phone:  [phone]    Fax:  [phone]                                       Nerissa Sun   MRN: [id number]    Department:  [facility] Emergency Department   Date of Visit:  12/22/2017           After Visit Summary Signature Page     I have received my discharge instructions, and my questions have been answered. I have discussed any challenges I see with this plan with the nurse or doctor.    ..........................................................................................................................................  Patient/Patient Representative Signature      ..........................................................................................................................................  Patient Representative Print Name and Relationship to Patient    ..................................................               ................................................  Date                                            Time    ..........................................................................................................................................  Reviewed by Signature/Title    ...................................................              ..............................................  Date                                                            Time

## 2017-12-22 NOTE — ED NOTES
Pt presents to ED via EMS after falling out of bed while dreaming. Pt fell out of bed about 4 weeks ago and has hairline fx on low back from it. Hx osteoporosis. EMS stated bed was about 4ft tall. ABCs intact. A&Ox3.

## 2017-12-22 NOTE — DISCHARGE INSTRUCTIONS
Vertebral Compression Fracture    You have a compression fracture or break in one of the bones in your spine. This kind of break usually happens in older people with thinning of the bones called osteoporosis. It may happen after a ground level fall or even with a very minor force. This can include bending forward, getting up from a seated position, coughing, or sneezing.  It may also occur in young healthy people after a severe trauma, such as a car accident or fall from a height. This is generally a stable break and usually does not cause any injury to the spinal cord or nerves. This injury usually takes 1 to 3 months to heal. It can be treated at home with bed rest and pain medicine.  Prescription or over-the-counter pain medicine can be used to control the pain. Long-term use of pain medicine can increase the risk of side effects. This includes: liver or kidney damage, GI bleeding, constipation, or narcotic dependence. If you have chronic liver or kidney disease, or ever had a stomach ulcer or GI bleeding, talk with your healthcare provider before using these medicines. If pain medicine is needed for more than 1 to 2 weeks, talk with your healthcare provider about other treatment options.  A back brace or abdominal binder may be prescribed to reduce pain by limiting motion at the site of the break. If you have osteoporosis, talk with your healthcare provider about using calcium and vitamin D supplements. You may need prescription medicines to prevent further bone loss. An exercise program to strengthen spine strength is a very important part of the treatment plan. It should begin once the pain is under control.  If you have severe or persistent pain, your healthcare provider may recommend a procedure called a vertebral augmentation. In this procedure, a needle is used to inject a bone cement into the broken vertebra. This will expand it  to its original shape.  Home care    You may need to stay in  bed for the first few days. But, begin sitting or walking as soon as possible. This will help you avoid problems with prolonged bed rest such as: muscle weakness, worsening back stiffness and pain, and blood clots in the legs.    When in bed, try to find a comfortable position. A firm mattress is best. Try lying flat on your back with pillows under your knees. You can also try lying on your side with your knees bent up towards your chest and a pillow between your knees.    Avoid sitting for long periods of time. This puts more stress on the lower back than standing or walking.    Apply an ice pack over the injured area for 15 to 20 minutes every 3 to 6 hours. You should do this for the first 24 to 48 hours. You can make an ice pack by filling a plastic bag that seals at the top with ice cubes and then wrapping it with a thin towel. You can start with ice, then switch to heat after two days. Apply heat (warm shower or warm bath) for 15 to 20 minutes several times a day for muscle spasms. Some patients feel best alternating ice and heat treatments. Use the one method that feels the best to you. Be careful not to injure your skin with the ice or heat treatments. Ice should never be applied directly to skin. Warm rather than hot heat should be used to protect skin areas that have decreased sensation.    Take pain medicine as directed. Call your healthcare provider if your pain is not well-controlled. A dose change, stronger medicine, or other treatment options may be needed.    Be aware of safe lifting methods and do not lift anything over 10 pounds until all the pain is gone.  Follow-up care  Follow up with your healthcare provider, or as advised. If X-rays were taken, you will be told of any new findings that may affect your care.  Call 911  Call 911 if you have:    Weakness or numbness in one or both legs    Loss of control over bowels or bladder    Numbness in the groin area  When to seek medical advice  Call your  healthcare provider right away if the pain gets worse or spreads to your arms or legs.  Date Last Reviewed: 11/23/2015 2000-2017 [employer]. [address] [facility], [place] [address]. All rights reserved. This information is not intended as a substitute for professional medical care. Always follow your healthcare professional's instructions.

## 2017-12-22 NOTE — ED PROVIDER NOTES
"  History     Chief Complaint:  Fall    HPI   Nerissa Sun is a 67 year old female with REM sleep behavior disorder, spondylolisthesis, fibromyalgia, osteoporosis, who presents by EMS following a fall. She has had recent falls, last one 4 weeks ago which caused a hairline fracture on low back.    Patient reports she fell of a fairly tall bed when acting out a dream. No LOC.  Patient thinks she might have hit the back of her head, but here she denies headache. Her only complain is mid thoracic back pain, denies neck pain. Patient lied on the floor \"for a bit\" and EMS assisted her to get up. She did ambulate for EMS.    Allergies:  Latex      Medications:    Diazepam  Sucralfate  Miralax  Calcium carbonate - Vit D   Liothyronine    Bupropion  Levothyroxine     Past Medical History:    Facial BCC  Dysthymia  Alcohol abuse  Fibromyalgia  GERD  Hypothyroidism   PONV   Osteoporosis  Lumbar radiculopathy   Spondylolisthesis  IBS   Vertigo     Past Surgical History:    Appendectomy  Bilateral carpal tunnel release  Cataract surgery  Surgery for ectopic pregnancy  D&C x 2   Wrist fracture repair  Total hysterectomy  Tympanomastoidectomy with Laser PCO2  Left rotator cuff repair    Family History:    Lung cancer  MI  Thyroid disease  Alcohol/drug abuse  Breast cancer  Throat cancer    Social History:  Marital Status:    Smoking status: former, quit 1990  Alcohol status: negative  Patient presents by EMS from private residence. She is accompanied by friends.  PCP: Cyndi Rodriguez      Review of Systems   Musculoskeletal: Positive for back pain. Negative for neck pain.   Neurological: Negative for headaches.   All other systems reviewed and are negative.    Physical Exam     Patient Vitals for the past 24 hrs:   BP Temp Temp src Pulse Heart Rate Resp SpO2   12/22/17 0500 - - - - - - 96 %   12/22/17 0400 158/88 - - - - - 97 %   12/22/17 0330 154/86 97.7  F (36.5  C) Oral 57 57 16 99 %      Physical Exam  Vital " signs and nursing notes reviewed.     Constitutional: laying on gurney appears comfortable  HENT: Oropharynx is clear and moist, no scalp hematoma  Eyes: Conjunctivae are normal bilaterally. Pupils equal  Neck: normal range of motion, no midline tenderness  Cardiovascular: Normal rate, regular rhythm, normal heart sounds.   Pulmonary/Chest: Effort normal and breath sounds normal. No respiratory distress.   Abdominal: Soft. Bowel sounds are normal. No tenderness to palpation. No rebound or guarding.   Musculoskeletal: no extremity injury noted, no rib tenderness.  Point tenderness at midline of mid thoracic spine, no lumbar tenderness  Neurological: Alert and oriented. No focal weakness, no confusion  Skin: Skin is warm and dry. No rash noted.   Psych: normal affect  Emergency Department Course     Imaging:  Radiology findings were communicated with the patient who voiced understanding of the findings.    Thoracic spine CT, without contrast, per radiology:    1. Moderate compression fracture of the inferior endplate of the T6 vertebral body, likely acute.  2. No other findings suspicious for acute fracture of the thoracic spine.  3. Normal alignment of the thoracic spine.    Interventions:  0352: Tramadol 50 mg, PO  0428: Methocarbamol 750 mg, PO      Emergency Department Course:  Nursing notes and vitals reviewed.  I performed an exam of the patient as documented above.     CT T-spine obtained while in the emergency department, findings above.      0525, discussed case with Jaylen Sanchez PA-C, Spine surgery.    I discussed the findings and treatment plan with the patient. She expressed understanding of this plan and consented to discharge. She will be discharged home with instructions for care and follow up. In addition, the patient will return to the emergency department if their symptoms persist, worsen, if new symptoms arise or if there is any concern.  All questions were answered.     Impression & Plan       Medical Decision Making:  Nerissa Sun is a 67 year old female who presents after sustaining a fall out of bed tonight. She had no other apparent injury on exam or reported pain other than at the midthoracic area. CT imaging was obtained that showed a probable acute moderate wedge compression fracture. There is no evidence of fragments and she has a normal neurovascular exam. After medication for pain she is able to ambulate without assistance, though she still has some discomfort. I discussed the case with on-call for [facility] for follow-up. They said they'll be able to follow-up with her today to set her up with an orthotic to assist with discomfort. We gave her a walker to help with the discomfort as well and medication for pain. This patient has no other injuries and her pain is well tolerated at this time, I felt she was safe for discharge home. Patient was given referral information and discharged in good condition.    Diagnosis:    ICD-10-CM   1. Closed wedge compression fracture of sixth thoracic vertebra, initial encounter (H) S22.050A     Disposition:   Discharge     Discharge Medications:  New Prescriptions    METHOCARBAMOL (ROBAXIN) 750 MG TABLET    Take 1 tablet (750 mg) by mouth 4 times daily as needed for muscle spasms    TRAMADOL (ULTRAM) 50 MG TABLET    Take 1 tablet (50 mg) by mouth every 6 hours as needed for moderate pain     Scribe Disclosure:  BRAYAN, Zohreh Ballard, am serving as a scribe at 3:26 AM on 12/22/2017 to document services personally performed by Jaylen Latif MD, based on my observations and the provider's statements to me.    [facility] EMERGENCY DEPARTMENT       Jaylen Latif MD  12/22/17 3893

## 2017-12-22 NOTE — LETTER
December 22, 2017      To Whom It May Concern:      Nerissa Sun was seen in our Emergency Department today, 12/22/17.  Due to medical reasons Mrs. Sun will not be able to fly due to medical reasons on 12/26/2018.     Sincerely,    Jaylen Latif MD

## 2017-12-22 NOTE — TELEPHONE ENCOUNTER
REASON FOR CALL:  Patient was seen in the ED recently and the ED doctor consulted with [facility]. Patient called this morning stating she needs to be fitted for a brace. [facility] does not have an order for a brace on this patient. Please jone [facility] know if they need to schedule her for a fitting.     Detailed message can be left: yes

## 2017-12-22 NOTE — TELEPHONE ENCOUNTER
Spoke with Jaylen Sanchez PA-C, and he recommends to order eveline brace through [facility]. Placed order. Patient notified.

## 2017-12-22 NOTE — TELEPHONE ENCOUNTER
Renae Sullivan rec'd call from patient.  In ER this AM due to EBENEZER causing vertebral fracture.  Dr. Paulino and fellow had previously discussed switching patient to Clonazepam from Diazepam.  Given EBENEZER event last night, it seems change is appropriate.    Will order Clonazepam.  Patient is tiny so will start with:  - Clonazepam 0.5 mg HS.    - Stop Diazepam  - Increase melatonin to 15 - 18 mg if not already taking that dose (unless she has previously had significant sedation at higher melatonin doses).  - She needs to start sleeping in sleeping bag as recommended by Dr. Paulino and fellow.    Recommend patient call in late next week to let us know how things are going and we can decide on further management.    Tommy Riojas MD, Sleep Physician  Dec 22, 2017

## 2017-12-22 NOTE — TELEPHONE ENCOUNTER
Called prescription for Clonazepam 0.5 tabs with 5 refills to [facility] in [place] on [address]. Called patient to notify her.    Kristin Sullivan  Sleep Clinic - Specialist

## 2017-12-22 NOTE — ED AVS SNAPSHOT
[facility]    [address]    [facility] [address]    Phone:  [phone]    Fax:  [phone]                                       Nerissa Sun   MRN: [id number]    Department:  [facility]   Date of Visit:  12/22/2017           Patient Information     Date Of Birth          1950        Your diagnoses for this visit were:     Closed wedge compression fracture of sixth thoracic vertebra, initial encounter (H)        You were seen by Tyra Richardson MD and Jaylen Latif MD.      Follow-up Information     Follow up with [id number] ([facility]).    Why:  call today after 830 am to set up for brace fitting    Contact information:    [address] [place] [address]  [facility] [address] [phone]        Discharge Instructions         Vertebral Compression Fracture    You have a compression fracture or break in one of the bones in your spine. This kind of break usually happens in older people with thinning of the bones called osteoporosis. It may happen after a ground level fall or even with a very minor force. This can include bending forward, getting up from a seated position, coughing, or sneezing.  It may also occur in young healthy people after a severe trauma, such as a car accident or fall from a height. This is generally a stable break and usually does not cause any injury to the spinal cord or nerves. This injury usually takes 1 to 3 months to heal. It can be treated at home with bed rest and pain medicine.  Prescription or over-the-counter pain medicine can be used to control the pain. Long-term use of pain medicine can increase the risk of side effects. This includes: liver or kidney damage, GI bleeding, constipation, or narcotic dependence. If you have chronic liver or kidney disease, or ever had a stomach ulcer or GI bleeding, talk with your healthcare provider before using these  medicines. If pain medicine is needed for more than 1 to 2 weeks, talk with your healthcare provider about other treatment options.  A back brace or abdominal binder may be prescribed to reduce pain by limiting motion at the site of the break. If you have osteoporosis, talk with your healthcare provider about using calcium and vitamin D supplements. You may need prescription medicines to prevent further bone loss. An exercise program to strengthen spine strength is a very important part of the treatment plan. It should begin once the pain is under control.  If you have severe or persistent pain, your healthcare provider may recommend a procedure called a vertebral augmentation. In this procedure, a needle is used to inject a bone cement into the broken vertebra. This will expand it  to its original shape.  Home care    You may need to stay in bed for the first few days. But, begin sitting or walking as soon as possible. This will help you avoid problems with prolonged bed rest such as: muscle weakness, worsening back stiffness and pain, and blood clots in the legs.    When in bed, try to find a comfortable position. A firm mattress is best. Try lying flat on your back with pillows under your knees. You can also try lying on your side with your knees bent up towards your chest and a pillow between your knees.    Avoid sitting for long periods of time. This puts more stress on the lower back than standing or walking.    Apply an ice pack over the injured area for 15 to 20 minutes every 3 to 6 hours. You should do this for the first 24 to 48 hours. You can make an ice pack by filling a plastic bag that seals at the top with ice cubes and then wrapping it with a thin towel. You can start with ice, then switch to heat after two days. Apply heat (warm shower or warm bath) for 15 to 20 minutes several times a day for muscle spasms. Some patients feel best alternating ice and heat treatments. Use the one method  that feels the best to you. Be careful not to injure your skin with the ice or heat treatments. Ice should never be applied directly to skin. Warm rather than hot heat should be used to protect skin areas that have decreased sensation.    Take pain medicine as directed. Call your healthcare provider if your pain is not well-controlled. A dose change, stronger medicine, or other treatment options may be needed.    Be aware of safe lifting methods and do not lift anything over 10 pounds until all the pain is gone.  Follow-up care  Follow up with your healthcare provider, or as advised. If X-rays were taken, you will be told of any new findings that may affect your care.  Call 911  Call 911 if you have:    Weakness or numbness in one or both legs    Loss of control over bowels or bladder    Numbness in the groin area  When to seek medical advice  Call your healthcare provider right away if the pain gets worse or spreads to your arms or legs.  Date Last Reviewed: 11/23/2015 2000-2017 [employer]. [address] [place] [address]. All rights reserved. This information is not intended as a substitute for professional medical care. Always follow your healthcare professional's instructions.          Future Appointments        Provider Department Dept Phone Center    2/14/2018 11:00 AM Deny Paulino MD [facility] [place] [phone] [facility]      24 Hour Appointment Hotline       To make an appointment at any [facility], call [phone] ([phone]). If you don't have a family doctor or clinic, we will help you find one. [facility] clinics are conveniently located to serve the needs of you and your family.             Review of your medicines      START taking        Dose / Directions Last dose taken    methocarbamol 750 MG tablet   Commonly known as:  ROBAXIN   Dose:  750 mg   Quantity:  20 tablet        Take 1 tablet (750 mg) by mouth 4 times daily as needed for  muscle spasms   Refills:  0        traMADol 50 MG tablet   Commonly known as:  ULTRAM   Dose:  50 mg   Quantity:  15 tablet        Take 1 tablet (50 mg) by mouth every 6 hours as needed for moderate pain   Refills:  0          Our records show that you are taking the medicines listed below. If these are incorrect, please call your family doctor or clinic.        Dose / Directions Last dose taken    Acidophilus Tabs   Dose:  2 capsule        Take 2 capsules by mouth daily. 10 billion in 2 capsules   Refills:  0        ascorbic acid 1000 MG Tabs   Commonly known as:  vitamin C   Dose:  1000 mg        Take 1,000 mg by mouth   Refills:  0        buPROPion 150 MG 24 hr tablet   Commonly known as:  WELLBUTRIN XL   Dose:  450 mg        Take 450 mg by mouth every morning.   Refills:  0        calcium 1200 4995-6325 MG-UNIT Chew   Quantity:  30 tablet        Take by mouth 2 times daily Liquid- with magnesium   Refills:  0        cholecalciferol 5000 UNITS Caps capsule   Commonly known as:  vitamin D3   Dose:  5000 Units        Take 1 capsule (5,000 Units) by mouth daily   Refills:  0        COLLAGEN PO        Refills:  0        diazepam 5 MG tablet   Commonly known as:  VALIUM   Dose:  5 mg        Take 5 mg by mouth   Refills:  0        levothyroxine 112 MCG tablet   Commonly known as:  SYNTHROID/LEVOTHROID        Take  by mouth daily.   Refills:  0        LIOTHYRONINE SODIUM PO   Dose:  5 mcg        Take 5 mcg by mouth 2 times daily.   Refills:  0        magnesium citrate solution   Quantity:  296 mL        Drink 150mL PO once. If no BM in 8 hours, drink another 150mL PO   Refills:  0        MELATONIN   Dose:  1 mg        1 mg At Bedtime   Refills:  0        MIRALAX powder   Dose:  1 capful   Quantity:  510 g   Generic drug:  polyethylene glycol        Take 17 g by mouth daily   Refills:  1        OMEGA-3 FISH OIL PO        Refills:  0        sucralfate 1 GM tablet   Commonly known as:  CARAFATE        Take by mouth as  needed   Refills:  0                Prescriptions were sent or printed at these locations (2 Prescriptions)                   Other Prescriptions                Printed at Department/Unit printer (2 of 2)         traMADol (ULTRAM) 50 MG tablet               methocarbamol (ROBAXIN) 750 MG tablet                Procedures and tests performed during your visit     CT Thoracic Spine w/o Contrast      Orders Needing Specimen Collection     None      Pending Results     No orders found from 12/20/2017 to 12/23/2017.            Pending Culture Results     No orders found from 12/20/2017 to 12/23/2017.            Pending Results Instructions     If you had any lab results that were not finalized at the time of your Discharge, you can call the ED Lab Result RN at [phone]. You will be contacted by this team for any positive Lab results or changes in treatment. The nurses are available 7 days a week from 10A to 6:30P.  You can leave a message 24 hours per day and they will return your call.        Test Results From Your Hospital Stay        12/22/2017  4:50 AM      Narrative     CT THORACIC SPINE WITHOUT CONTRAST  12/22/2017 4:22 AM     HISTORY: Trauma. Pain.    COMPARISON: 1/9/2015 - CT abdomen and pelvis.    TECHNIQUE: Without intravenous contrast, helical sections were  acquired through the thoracic spine. Coronal and sagittal  reconstructions were generated. Radiation dose for this scan was  reduced using automated exposure control, adjustment of the mA and/or  kV according to the patient's size, or iterative reconstruction  technique.    FINDINGS: Normal alignment of the thoracic spine. Moderate compression  deformity of the inferior endplate of the T6 vertebral body, likely  acute. No other findings suspicious for fracture of the thoracic  spine. Several endplate irregularities are present within the thoracic  spine, likely representing Schmorl's nodes. Atherosclerotic  calcification in the thoracic aorta.         Impression     IMPRESSION:  1. Moderate compression fracture of the inferior endplate of the T6  vertebral body, likely acute.  2. No other findings suspicious for acute fracture of the thoracic  spine.  3. Normal alignment of the thoracic spine.    FLAVIO GARCIA MD                Clinical Quality Measure: Blood Pressure Screening     Your blood pressure was checked while you were in the emergency department today. The last reading we obtained was  BP: 158/88 . Please read the guidelines below about what these numbers mean and what you should do about them.  If your systolic blood pressure (the top number) is less than 120 and your diastolic blood pressure (the bottom number) is less than 80, then your blood pressure is normal. There is nothing more that you need to do about it.  If your systolic blood pressure (the top number) is 120-139 or your diastolic blood pressure (the bottom number) is 80-89, your blood pressure may be higher than it should be. You should have your blood pressure rechecked within a year by a primary care provider.  If your systolic blood pressure (the top number) is 140 or greater or your diastolic blood pressure (the bottom number) is 90 or greater, you may have high blood pressure. High blood pressure is treatable, but if left untreated over time it can put you at risk for heart attack, stroke, or kidney failure. You should have your blood pressure rechecked by a primary care provider within the next 4 weeks.  If your provider in the emergency department today gave you specific instructions to follow-up with your doctor or provider even sooner than that, you should follow that instruction and not wait for up to 4 weeks for your follow-up visit.        Thank you for choosing [facility]       Thank you for choosing [facility] for your care. Our goal is always to provide you with excellent care. Hearing back from our patients is one way we can continue to improve our services. Please take a  few minutes to complete the written survey that you may receive in the mail after you visit with us. Thank you!        [employer] Information     [employer] gives you secure access to your electronic health record. If you see a primary care provider, you can also send messages to your care team and make appointments. If you have questions, please call your primary care clinic.  If you do not have a primary care provider, please call [phone] and they will assist you.        Care EveryWhere ID     This is your Care EveryWhere ID. This could be used by other organizations to access your [facility] medical records  [id number]        Equal Access to Services     [facility] : Gabriel Gilman, sakshi sarah, lila thayer. So [facility] [phone].    ATENCIÓN: Si habla español, tiene a jensen disposición servicios gratuitos de asistencia lingüística. Kalpeshame al [phone].    We comply with applicable federal civil rights laws and [place] laws. We do not discriminate on the basis of race, color, national origin, age, disability, sex, sexual orientation, or gender identity.            After Visit Summary       This is your record. Keep this with you and show to your community pharmacist(s) and doctor(s) at your next visit.

## 2017-12-23 NOTE — ED NOTES
Patient called department twice on the morning of 12/23/17 asking for refill of pain medication. Writer returned patient's call at 0950 and patient stated to writer that she was told not to drive for 15 days, so she is unable to get to her primary care physician due to that. I reviewed the patient's AVS with her and their is no instruction in the AVS regarding inability to drive for 15 days. Advised patient to try and get in to her primary, and return to ED if needed.

## 2018-01-03 ENCOUNTER — NURSE TRIAGE (OUTPATIENT)
Dept: NURSING | Facility: CLINIC | Age: 68
End: 2018-01-03

## 2018-01-03 ENCOUNTER — TELEPHONE (OUTPATIENT)
Dept: SLEEP MEDICINE | Facility: CLINIC | Age: 68
End: 2018-01-03

## 2018-01-03 NOTE — TELEPHONE ENCOUNTER
Nerissa had a fracture in back and was prescribed Clonazepam .5mg.  Nerissa is having  symptoms of a panic attack and tongue is thicker.  Nerissa is requesting to get off this medication and is requesting  to speak with clinic direct.

## 2018-01-03 NOTE — TELEPHONE ENCOUNTER
Patient is calling she is having suicidal thoughts, fear, crying, shaking, tongue feels to be thick and she is having difficulty talking. Patient has been taking clonazepam and thinks this medication is causing these symptoms and would like to stop taking, calling for advice.

## 2018-01-03 NOTE — TELEPHONE ENCOUNTER
I called to discuss with the patient her urgent concerns.      Very challenging: recent breakthrough dream enactment resulted in vertebral fracture.  Started on clonazepam but is now more depressed likely due to the clonazepam but also she describes fearfulness which may very well be the fact she is no longer on diazepam during the day for anxiety.      After extensive discussion we came to the following conclusions.     -stop clonazepam now.  -immediately discuss with psychiatrist her anxiety including suicidal thoughts.  She contracted for safety with me.  Would reach out if thoughts returned or worsened.   -will continue on higher doses of melatonin (now 20 mg) will continue to use sleeping bag, recently got bed rails and will sleep with mattress on the floor.    -Will keep us updated.

## 2018-03-28 ENCOUNTER — OFFICE VISIT (OUTPATIENT)
Dept: SLEEP MEDICINE | Facility: CLINIC | Age: 68
End: 2018-03-28
Payer: MEDICARE

## 2018-03-28 VITALS
HEART RATE: 57 BPM | OXYGEN SATURATION: 99 % | DIASTOLIC BLOOD PRESSURE: 70 MMHG | WEIGHT: 106 LBS | RESPIRATION RATE: 14 BRPM | BODY MASS INDEX: 17.66 KG/M2 | HEIGHT: 65 IN | SYSTOLIC BLOOD PRESSURE: 115 MMHG

## 2018-03-28 DIAGNOSIS — G47.52 RBD (REM BEHAVIORAL DISORDER): Primary | ICD-10-CM

## 2018-03-28 PROCEDURE — 99214 OFFICE O/P EST MOD 30 MIN: CPT | Performed by: PSYCHIATRY & NEUROLOGY

## 2018-03-28 NOTE — PATIENT INSTRUCTIONS

## 2018-03-28 NOTE — PROGRESS NOTES
Patient returns to discuss challenging dream enactment behavior, sleep related injury, risk for neurodegeneration.      Modestly improved living situation, less stress, thus less dream enactment and vivid dreams. She is sleeping in the sleeping bag, off of escitalopram, taking much less melatonin (currently none), diazepam and this seems to be working well.     I gave her permission to go back up on melatonin 3-6-9mg as needed and she indicated she would do so.  Discussed the importance of never driving if tired or sleepy, no weapons in the bedroom and she indicated she understands.      Of note clonazepam resulted in exacerbation of her mood depression.  We will hold off on that in the future unless necessary.  I did mention to her, as she is interested in better controlling her mood without more meds, neuromodulation with Dr Silvestre.  She was interested in learning more I gave her his contact info and she will look into it.    No current issues of anosmia, constipation, tremor, hand writing changes, gait or balance issues. I reassured her that since her dream enactment is pretty clearly related to her medication her neurodegeneration risk is low.       All questions were answered.    It is a great privilege being asked to participate in this patients care.     25 minutes were spent with this patient greater than 50% in counsiling and coordination of care.

## 2018-03-28 NOTE — NURSING NOTE
"Chief Complaint   Patient presents with     Sleep Problem       Initial /70  Pulse 57  Resp 14  Ht 1.638 m (5' 4.5\")  Wt 48.1 kg (106 lb)  SpO2 99%  BMI 17.91 kg/m2 Estimated body mass index is 17.91 kg/(m^2) as calculated from the following:    Height as of this encounter: 1.638 m (5' 4.5\").    Weight as of this encounter: 48.1 kg (106 lb).  Medication Reconciliation: complete     ESS 2  Cherri Samuels    "

## 2018-03-28 NOTE — MR AVS SNAPSHOT
After Visit Summary   3/28/2018    Nerissa Sun    MRN: [id number]           Patient Information     Date Of Birth          1950        Visit Information        Provider Department      3/28/2018 11:00 AM Deny Paulino MD [facility]        Today's Diagnoses     RBD (REM behavioral disorder)    -  1      Care Instructions      Your BMI is Body mass index is 17.91 kg/(m^2).  Weight management is a personal decision.  If you are interested in exploring weight loss strategies, the following discussion covers the approaches that may be successful. Body mass index (BMI) is one way to tell whether you are at a healthy weight, overweight, or obese. It measures your weight in relation to your height.  A BMI of 18.5 to 24.9 is in the healthy range. A person with a BMI of 25 to 29.9 is considered overweight, and someone with a BMI of 30 or greater is considered obese. More than two-thirds of American adults are considered overweight or obese.  Being overweight or obese increases the risk for further weight gain. Excess weight may lead to heart disease and diabetes.  Creating and following plans for healthy eating and physical activity may help you improve your health.  Weight control is part of healthy lifestyle and includes exercise, emotional health, and healthy eating habits. Careful eating habits lifelong are the mainstay of weight control. Though there are significant health benefits from weight loss, long-term weight loss with diet alone may be very difficult to achieve- studies show long-term success with dietary management in less than 10% of people. Attaining a healthy weight may be especially difficult to achieve in those with severe obesity. In some cases, medications, devices and surgical management might be considered.  What can you do?  If you are overweight or obese and are interested in methods for weight loss, you should discuss this with your provider.      Consider reducing daily calorie intake by 500 calories.     Keep a food journal.     Avoiding skipping meals, consider cutting portions instead.    Diet combined with exercise helps maintain muscle while optimizing fat loss. Strength training is particularly important for building and maintaining muscle mass. Exercise helps reduce stress, increase energy, and improves fitness. Increasing exercise without diet control, however, may not burn enough calories to loose weight.       Start walking three days a week 10-20 minutes at a time    Work towards walking thirty minutes five days a week     Eventually, increase the speed of your walking for 1-2 minutes at time    In addition, we recommend that you review healthy lifestyles and methods for weight loss available through the National Institutes of Health patient information sites:  http://win.niddk.nih.gov/publications/index.htm    And look into health and wellness programs that may be available through your health insurance provider, employer, local community center, or jorge club.    Weight management plan Patient was referred to their PCP to discuss a diet and exercise plan.            Follow-ups after your visit        Your next 10 appointments already scheduled     Mar 20, 2019 11:00 AM CDT   Return Sleep Patient with eDny Paulino MD   [facility] ([facility])    [address] [address] [phone]              Who to contact     If you have questions or need follow up information about today's clinic visit or your schedule please contact [facility] directly at [phone].  Normal or non-critical lab and imaging results will be communicated to you by MyChart, letter or phone within 4 business days after the clinic has received the results. If you do not hear from us within 7 days, please contact the clinic through MyChart or phone. If you have a critical or  "abnormal lab result, we will notify you by phone as soon as possible.  Submit refill requests through [employer] or call your pharmacy and they will forward the refill request to us. Please allow 3 business days for your refill to be completed.          Additional Information About Your Visit        MyChart Information     [employer] gives you secure access to your electronic health record. If you see a primary care provider, you can also send messages to your care team and make appointments. If you have questions, please call your primary care clinic.  If you do not have a primary care provider, please call [phone] and they will assist you.        Care EveryWhere ID     This is your Care EveryWhere ID. This could be used by other organizations to access your [place] medical records  [id number]        Your Vitals Were     Pulse Respirations Height Pulse Oximetry BMI (Body Mass Index)       57 14 1.638 m (5' 4.5\") 99% 17.91 kg/m2        Blood Pressure from Last 3 Encounters:   03/28/18 115/70   12/22/17 158/88   12/13/17 104/66    Weight from Last 3 Encounters:   03/28/18 48.1 kg (106 lb)   12/13/17 46.3 kg (102 lb)   12/11/17 49.9 kg (110 lb)              Today, you had the following     No orders found for display       Primary Care Provider Office Phone # Fax #    Cyndi RAPP French Rodriguez [phone]       [address] [address]        Equal Access to Services     GUILLERMO WILLIS AH: Hadii aad ku rianao Sovivienali, waaxda luqadaha, qaybta kaalmada adeegyada, lila borrero. So [facility] [phone].    ATENCIÓN: Si habla español, tiene a jensen disposición servicios gratuitos de asistencia lingüística. Llame al [phone].    We comply with applicable federal civil rights laws and [place] laws. We do not discriminate on the basis of race, color, national origin, age, disability, sex, sexual orientation, or gender identity.            Thank you!     Thank you " for choosing [facility]  for your care. Our goal is always to provide you with excellent care. Hearing back from our patients is one way we can continue to improve our services. Please take a few minutes to complete the written survey that you may receive in the mail after your visit with us. Thank you!             Your Updated Medication List - Protect others around you: Learn how to safely use, store and throw away your medicines at www.disposemymeds.org.          This list is accurate as of 3/28/18 12:02 PM.  Always use your most recent med list.                   Brand Name Dispense Instructions for use Diagnosis    Acidophilus Tabs      Take 2 capsules by mouth daily. 10 billion in 2 capsules    Hypothyroid, Osteoporosis with pathological fracture, Osteoporosis, postmenopausal       ascorbic acid 1000 MG Tabs    vitamin C     Take 1,000 mg by mouth        buPROPion 150 MG 24 hr tablet    WELLBUTRIN XL     Take 450 mg by mouth every morning.        calcium 1200 [id number] MG-UNIT Chew     30 tablet    Take by mouth 2 times daily Liquid- with magnesium        cholecalciferol 5000 UNITS Caps capsule    vitamin D3     Take 1 capsule (5,000 Units) by mouth daily        COLLAGEN PO           levothyroxine 112 MCG tablet    SYNTHROID/LEVOTHROID     Take  by mouth daily.        LIOTHYRONINE SODIUM PO      Take 5 mcg by mouth 2 times daily.        magnesium citrate solution     296 mL    Drink 150mL PO once. If no BM in 8 hours, drink another 150mL PO        MELATONIN      1 mg At Bedtime        methocarbamol 750 MG tablet    ROBAXIN    20 tablet    Take 1 tablet (750 mg) by mouth 4 times daily as needed for muscle spasms        MIRALAX powder   Generic drug:  polyethylene glycol     510 g    Take 17 g by mouth daily        OMEGA-3 FISH OIL PO           sucralfate 1 GM tablet    CARAFATE     Take by mouth as needed

## 2018-12-05 ENCOUNTER — APPOINTMENT (OUTPATIENT)
Dept: CT IMAGING | Facility: CLINIC | Age: 68
End: 2018-12-05
Attending: EMERGENCY MEDICINE
Payer: MEDICARE

## 2018-12-05 ENCOUNTER — NURSE TRIAGE (OUTPATIENT)
Dept: NURSING | Facility: CLINIC | Age: 68
End: 2018-12-05

## 2018-12-05 ENCOUNTER — HOSPITAL ENCOUNTER (OUTPATIENT)
Facility: CLINIC | Age: 68
Setting detail: OBSERVATION
Discharge: HOME | End: 2018-12-06
Attending: EMERGENCY MEDICINE | Admitting: INTERNAL MEDICINE
Payer: MEDICARE

## 2018-12-05 DIAGNOSIS — G45.9 TIA (TRANSIENT ISCHEMIC ATTACK): Primary | ICD-10-CM

## 2018-12-05 DIAGNOSIS — R53.1 RIGHT SIDED WEAKNESS: ICD-10-CM

## 2018-12-05 DIAGNOSIS — R47.9 DIFFICULTY WITH SPEECH: ICD-10-CM

## 2018-12-05 LAB
ALBUMIN UR-MCNC: NEGATIVE MG/DL
ANION GAP SERPL CALCULATED.3IONS-SCNC: 6 MMOL/L (ref 3–14)
APPEARANCE UR: CLEAR
APTT PPP: 32 SEC (ref 22–37)
BASOPHILS # BLD AUTO: 0 10E9/L (ref 0–0.2)
BASOPHILS NFR BLD AUTO: 0.7 %
BILIRUB UR QL STRIP: NEGATIVE
BUN SERPL-MCNC: 25 MG/DL (ref 7–30)
CALCIUM SERPL-MCNC: 8.9 MG/DL (ref 8.5–10.1)
CHLORIDE SERPL-SCNC: 105 MMOL/L (ref 94–109)
CO2 SERPL-SCNC: 28 MMOL/L (ref 20–32)
COLOR UR AUTO: COLORLESS
CREAT SERPL-MCNC: 1 MG/DL (ref 0.52–1.04)
DIFFERENTIAL METHOD BLD: ABNORMAL
EOSINOPHIL # BLD AUTO: 0.2 10E9/L (ref 0–0.7)
EOSINOPHIL NFR BLD AUTO: 2.7 %
ERYTHROCYTE [DISTWIDTH] IN BLOOD BY AUTOMATED COUNT: 11.8 % (ref 10–15)
GFR SERPL CREATININE-BSD FRML MDRD: 55 ML/MIN/1.7M2
GLUCOSE BLDC GLUCOMTR-MCNC: 84 MG/DL (ref 70–99)
GLUCOSE SERPL-MCNC: 84 MG/DL (ref 70–99)
GLUCOSE UR STRIP-MCNC: NEGATIVE MG/DL
HCT VFR BLD AUTO: 41.4 % (ref 35–47)
HGB BLD-MCNC: 13.5 G/DL (ref 11.7–15.7)
HGB UR QL STRIP: NEGATIVE
IMM GRANULOCYTES # BLD: 0 10E9/L (ref 0–0.4)
IMM GRANULOCYTES NFR BLD: 0.3 %
INR PPP: 0.96 (ref 0.86–1.14)
KETONES UR STRIP-MCNC: NEGATIVE MG/DL
LEUKOCYTE ESTERASE UR QL STRIP: NEGATIVE
LYMPHOCYTES # BLD AUTO: 1.6 10E9/L (ref 0.8–5.3)
LYMPHOCYTES NFR BLD AUTO: 26.5 %
MCH RBC QN AUTO: 33.8 PG (ref 26.5–33)
MCHC RBC AUTO-ENTMCNC: 32.6 G/DL (ref 31.5–36.5)
MCV RBC AUTO: 104 FL (ref 78–100)
MONOCYTES # BLD AUTO: 0.6 10E9/L (ref 0–1.3)
MONOCYTES NFR BLD AUTO: 9.9 %
NEUTROPHILS # BLD AUTO: 3.5 10E9/L (ref 1.6–8.3)
NEUTROPHILS NFR BLD AUTO: 59.9 %
NITRATE UR QL: NEGATIVE
NRBC # BLD AUTO: 0 10*3/UL
NRBC BLD AUTO-RTO: 0 /100
PH UR STRIP: 7 PH (ref 5–7)
PLATELET # BLD AUTO: 167 10E9/L (ref 150–450)
POTASSIUM SERPL-SCNC: 3.8 MMOL/L (ref 3.4–5.3)
RBC # BLD AUTO: 3.99 10E12/L (ref 3.8–5.2)
RBC #/AREA URNS AUTO: <1 /HPF (ref 0–2)
SODIUM SERPL-SCNC: 139 MMOL/L (ref 133–144)
SOURCE: NORMAL
SP GR UR STRIP: 1.02 (ref 1–1.03)
TROPONIN I SERPL-MCNC: <0.015 UG/L (ref 0–0.04)
UROBILINOGEN UR STRIP-MCNC: 0 MG/DL (ref 0–2)
WBC # BLD AUTO: 5.9 10E9/L (ref 4–11)
WBC #/AREA URNS AUTO: 1 /HPF (ref 0–5)

## 2018-12-05 PROCEDURE — 99220 ZZC INITIAL OBSERVATION CARE,LEVL III: CPT | Performed by: PHYSICIAN ASSISTANT

## 2018-12-05 PROCEDURE — A9270 NON-COVERED ITEM OR SERVICE: HCPCS | Mod: GY | Performed by: PHYSICIAN ASSISTANT

## 2018-12-05 PROCEDURE — 80061 LIPID PANEL: CPT | Performed by: EMERGENCY MEDICINE

## 2018-12-05 PROCEDURE — 70450 CT HEAD/BRAIN W/O DYE: CPT | Mod: XS

## 2018-12-05 PROCEDURE — 70496 CT ANGIOGRAPHY HEAD: CPT

## 2018-12-05 PROCEDURE — 0042T CT HEAD PERFUSION WITH CONTRAST: CPT

## 2018-12-05 PROCEDURE — 93005 ELECTROCARDIOGRAM TRACING: CPT

## 2018-12-05 PROCEDURE — 25000132 ZZH RX MED GY IP 250 OP 250 PS 637: Mod: GY | Performed by: PHYSICIAN ASSISTANT

## 2018-12-05 PROCEDURE — 80048 BASIC METABOLIC PNL TOTAL CA: CPT | Performed by: EMERGENCY MEDICINE

## 2018-12-05 PROCEDURE — 25000128 H RX IP 250 OP 636

## 2018-12-05 PROCEDURE — 99285 EMERGENCY DEPT VISIT HI MDM: CPT | Mod: 25

## 2018-12-05 PROCEDURE — 25000128 H RX IP 250 OP 636: Performed by: EMERGENCY MEDICINE

## 2018-12-05 PROCEDURE — G0378 HOSPITAL OBSERVATION PER HR: HCPCS

## 2018-12-05 PROCEDURE — 81001 URINALYSIS AUTO W/SCOPE: CPT | Performed by: EMERGENCY MEDICINE

## 2018-12-05 PROCEDURE — 85730 THROMBOPLASTIN TIME PARTIAL: CPT | Performed by: EMERGENCY MEDICINE

## 2018-12-05 PROCEDURE — 85025 COMPLETE CBC W/AUTO DIFF WBC: CPT | Performed by: EMERGENCY MEDICINE

## 2018-12-05 PROCEDURE — 84484 ASSAY OF TROPONIN QUANT: CPT | Performed by: EMERGENCY MEDICINE

## 2018-12-05 PROCEDURE — 00000146 ZZHCL STATISTIC GLUCOSE BY METER IP

## 2018-12-05 PROCEDURE — 83036 HEMOGLOBIN GLYCOSYLATED A1C: CPT | Performed by: EMERGENCY MEDICINE

## 2018-12-05 PROCEDURE — 85610 PROTHROMBIN TIME: CPT | Performed by: EMERGENCY MEDICINE

## 2018-12-05 RX ORDER — LIDOCAINE 40 MG/G
CREAM TOPICAL
Status: DISCONTINUED | OUTPATIENT
Start: 2018-12-05 | End: 2018-12-06 | Stop reason: HOSPADM

## 2018-12-05 RX ORDER — ONDANSETRON 2 MG/ML
INJECTION INTRAMUSCULAR; INTRAVENOUS
Status: COMPLETED
Start: 2018-12-05 | End: 2018-12-05

## 2018-12-05 RX ORDER — LEVOTHYROXINE SODIUM 88 UG/1
88 TABLET ORAL EVERY MORNING
COMMUNITY

## 2018-12-05 RX ORDER — ACETAMINOPHEN 325 MG/1
650 TABLET ORAL EVERY 4 HOURS PRN
Status: DISCONTINUED | OUTPATIENT
Start: 2018-12-05 | End: 2018-12-06 | Stop reason: HOSPADM

## 2018-12-05 RX ORDER — CHOLECALCIFEROL (VITAMIN D3) 50 MCG
4000 TABLET ORAL DAILY
COMMUNITY

## 2018-12-05 RX ORDER — ASPIRIN 81 MG/1
81 TABLET ORAL DAILY
Status: DISCONTINUED | OUTPATIENT
Start: 2018-12-05 | End: 2018-12-06 | Stop reason: HOSPADM

## 2018-12-05 RX ORDER — ONDANSETRON 2 MG/ML
4 INJECTION INTRAMUSCULAR; INTRAVENOUS EVERY 6 HOURS PRN
Status: DISCONTINUED | OUTPATIENT
Start: 2018-12-05 | End: 2018-12-06 | Stop reason: HOSPADM

## 2018-12-05 RX ORDER — IOPAMIDOL 755 MG/ML
500 INJECTION, SOLUTION INTRAVASCULAR ONCE
Status: COMPLETED | OUTPATIENT
Start: 2018-12-05 | End: 2018-12-05

## 2018-12-05 RX ORDER — NALOXONE HYDROCHLORIDE 0.4 MG/ML
.1-.4 INJECTION, SOLUTION INTRAMUSCULAR; INTRAVENOUS; SUBCUTANEOUS
Status: DISCONTINUED | OUTPATIENT
Start: 2018-12-05 | End: 2018-12-06 | Stop reason: HOSPADM

## 2018-12-05 RX ORDER — AMOXICILLIN 250 MG
2 CAPSULE ORAL 2 TIMES DAILY PRN
Status: DISCONTINUED | OUTPATIENT
Start: 2018-12-05 | End: 2018-12-06 | Stop reason: HOSPADM

## 2018-12-05 RX ORDER — ONDANSETRON 4 MG/1
4 TABLET, ORALLY DISINTEGRATING ORAL EVERY 6 HOURS PRN
Status: DISCONTINUED | OUTPATIENT
Start: 2018-12-05 | End: 2018-12-06 | Stop reason: HOSPADM

## 2018-12-05 RX ORDER — DIAZEPAM 5 MG
2.5 TABLET ORAL 3 TIMES DAILY PRN
COMMUNITY

## 2018-12-05 RX ORDER — POLYETHYLENE GLYCOL 3350 17 G/17G
17 POWDER, FOR SOLUTION ORAL DAILY PRN
Status: DISCONTINUED | OUTPATIENT
Start: 2018-12-05 | End: 2018-12-06 | Stop reason: HOSPADM

## 2018-12-05 RX ORDER — AMOXICILLIN 250 MG
1 CAPSULE ORAL 2 TIMES DAILY PRN
Status: DISCONTINUED | OUTPATIENT
Start: 2018-12-05 | End: 2018-12-06 | Stop reason: HOSPADM

## 2018-12-05 RX ADMIN — SODIUM CHLORIDE, PRESERVATIVE FREE 80 ML: 5 INJECTION INTRAVENOUS at 18:21

## 2018-12-05 RX ADMIN — IOPAMIDOL 120 ML: 755 INJECTION, SOLUTION INTRAVENOUS at 18:20

## 2018-12-05 RX ADMIN — ASPIRIN 81 MG: 81 TABLET, COATED ORAL at 23:33

## 2018-12-05 RX ADMIN — ONDANSETRON 4 MG: 2 INJECTION INTRAMUSCULAR; INTRAVENOUS at 18:48

## 2018-12-05 ASSESSMENT — ENCOUNTER SYMPTOMS
SHORTNESS OF BREATH: 0
NUMBNESS: 1
WEAKNESS: 1
SPEECH DIFFICULTY: 1

## 2018-12-05 NOTE — TELEPHONE ENCOUNTER
Patient says since last night her tongue has felt thick and can't get her words out.  Tonight, patient says her right side of body feels weaker; there's also tingling. Patient says she does not have high blood pressure and is not overweight.  Reviewed guideline and care advice with caller.  Caller verbalizes understanding.            Reason for Disposition    [1] Loss of speech or garbled speech AND [2] sudden onset AND [3] present now    Additional Information    Negative: [1] SEVERE weakness (i.e., unable to walk or barely able to walk, requires support) AND [2] new onset or worsening    Negative: [1] Weakness (i.e., paralysis, loss of muscle strength) of the face, arm / hand, or leg / foot on one side of the body AND [2] sudden onset AND [3] present now    Negative: [1] Numbness (i.e., loss of sensation) of the face, arm / hand, or leg / foot on one side of the body AND [2] sudden onset AND [3] present now    Protocols used: NEUROLOGIC DEFICIT-ADULT-

## 2018-12-05 NOTE — ED PROVIDER NOTES
History     Chief Complaint:  Right Sided Weakness, Loss of Words    HPI   Nerissa Sun is a 68 year old female who presents to the emergency department today for evaluation of right sided weakness and difficulty word finding. The patient reports that last night around 1900 she had an episode of right sided weakness and tingling and difficulty word finding that resolved without intervention. She explains that today around 1715 she again had a similar episode, prompting presentation to the ED today. The patient denies any shortness of breath, chest pain, or history of issues with her heart or arteries of the head.     Allergies:  Melatonin  Latex     Medications:    Wellbutrin  Collagen  Acidophilus  Synthroid  Liothyronine  Magnesium citrate  Carafate    Past Medical History:    Basal cell carcinoma of face  Chronic pain  Dysthymia  Alcohol abuse  Fibromyalgia  GERD  Blood transfusion  Hypothyroid  Fibromyalgia  Neutropenia  Osteoporosis  Lumbar radiculopathy  Vertigo  Spondylolisthesis    Past Surgical History:    Appendectomy  Carpal tunnel release x2  Cataract  Dilation and curettage x2  Left wrist fracture  Ectopic pregnancy  Hysterectomy  Laser CO2 tympanomastoidectomy  Left rotator cuff repair  Tympanoplasty    Family History:    Mother: lung cancer, MI, hypothyroid, varicose veins  Maternal Grandmother: hyperthyroid , throat cancer  Maternal Aunt: hypothyroid, breast cancer  Brother: hypothyroid  Father: alcohol drug  Sister: varicose veins    Social History:  Smoking Status: Former Smoker   Packs/day: 1   Years: 10   Quit date: 1/1/90  Smokeless Tobacco: Never Used  Alcohol Use: Negative  Marital Status:        Review of Systems   Respiratory: Negative for shortness of breath.    Cardiovascular: Negative for chest pain.   Neurological: Positive for speech difficulty, weakness and numbness.   All other systems reviewed and are negative.      Physical Exam     Patient Vitals for the past 24  hrs:   BP Temp Temp src Pulse Heart Rate Resp SpO2   12/05/18 2100 138/80 - - - 64 - -   12/05/18 2045 - - - - 68 - 97 %   12/05/18 2030 138/75 - - - 74 - 92 %   12/05/18 1945 - - - - 69 - 98 %   12/05/18 1930 128/76 - - - 70 - 99 %   12/05/18 1845 143/85 - - - 75 - 100 %   12/05/18 1815 162/84 98.6  F (37  C) Oral 86 - 18 98 %   12/05/18 1800 162/84 - - - - - -     Physical Exam  Constitutional: Patient is well appearing. No distress.  Head: Atraumatic.  Mouth/Throat: Oropharynx is clear and moist. No oropharyngeal exudate.  Eyes: Conjunctivae and EOM are normal. No scleral icterus.  Neck: Normal range of motion. Neck supple.   Cardiovascular: Normal rate, regular rhythm, normal heart sounds and intact distal pulses.   Pulmonary/Chest: Breath sounds normal. No respiratory distress.  Abdominal: Soft. Bowel sounds are normal. No distension. No tenderness. No rebound or guarding.   Musculoskeletal: Normal range of motion. No edema or tenderness.   Neuro: Alert, oriented x3, PERRL, EOMI, CN 2-7 and 9-12 intact, 5/5 grasp BUE, 5/5 elbow flexion and extension BUE, 5/5 shoulder abduction BUE, 5/5 hip flexion, knee flexion, knee extension, plantar and dorsiflexion BLE, no pronator drift, normal gait, negative romberg, no dysdiadochokinesia, normal finger-nose-finger testing    Skin: Warm and dry. No rash noted. Not diaphoretic.     Emergency Department Course     ECG:   ECG taken at 1807, ECG read at 1809  Normal sinus rhythm  Nonspecific ST abnormality  Abnormal ECG  Rate 70 bpm. IL interval 142 ms. QRS duration 88 ms. QT/QTc 382/412 ms. P-R-T axes 77 27 59.    Imaging:  Radiology findings were communicated with the patient who voiced understanding of the findings.    CTA Head Neck with Contrast  Patent arteries in the head and neck. No evidence of  dissection, occlusion, or flow-limiting stenosis.  MEDARDO NAGY MD  Reading per radiology    CT Head Perfusion w Contrast  Normal perfusion CT scan of the brain.  MEDARDO MCKEON  MD YAKOV  Reading per radiology    CT Head w/o Contrast  No evidence of acute ischemia or hemorrhage.  Findings were discussed by phone between Dr. Nagy and Dr. Purdy  at 6:28 PM on 12/5/2018.  MEDARDO NAGY MD  Reading per radiology    Laboratory:  Laboratory findings were communicated with the patient who voiced understanding of the findings.    UA with Microscopic: negative  CBC: WBC 5.9, HGB 13.5,   BMP: GFR Estimate 55 (L) o/w WNL (Creatinine 1.00)  INR: 0.96  PTT: 32  Troponin (Collected: 1818): <0.015  Glucose by Meter: 84    Interventions:  1848 Zofran 2 mg IV    Emergency Department Course:    1758 Nursing notes and vitals reviewed.    1759 I performed an exam of the patient as documented above.     1802 Code stroke called.     1807 EKG obtained as noted above.    1808 I spoke with Dr. Serna of the stroke neurology service regarding patient's presentation, findings, and plan of care.    1815 Glucose by meter obtained.    1818 IV was inserted and blood was drawn for laboratory testing, results above.    1820 Patient left for CTs, noted above.     1827 I spoke with the radiology service from [facility] regarding patient's presentation, findings, and plan of care.    1843 Patient returned to ED.    1917 The patient provided a urine sample here in the emergency department. This was sent for laboratory testing, findings above.    2006 I spoke with Dr. Serna of the stroke neurology service regarding patient's presentation, findings, and plan of care.    2042 I spoke with AMXIM Browne for Dr. Elder of the hospitalist service from [facility] regarding patient's presentation, findings, and plan of care.    2200 I personally reviewed the laboratory and imaging results with the patient and answered all related questions prior to admission.    Impression & Plan      Medical Decision Making:  Nerissa Sun is a 68 year old female comes today with symptoms of right sided tingling and  difficulty word finding consistent with a TIA. CT head, CTA head and neck, and CT head perfusion did not demonstrate evidence of bleed or other acute abnormality. Given that the symptoms were rapidly improving, treatment with thrombolytics is not indicated. Laboratory studies are otherwise unremarkable. However, given my discussion with Dr. Serna of the stroke neurology team, we will admit for further monitoring and evaluation. I spoke with the admitting physician on the hospitalist service who graciously agreed to admit the patient to telemetry for further workup and treatment.    Diagnosis:    ICD-10-CM    1. Right sided weakness R53.1    2. Difficulty with speech R47.9      Disposition:   The patient is admitted into the care of Dr. Elder.    Scribe Disclosure:  I, Maddie Olivier, am serving as a scribe at 6:09 PM on 12/5/2018 to document services personally performed by Chidi Purdy MD based on my observations and the provider's statements to me.    [facility] EMERGENCY DEPARTMENT       Chidi Purdy MD  12/05/18 [id number]

## 2018-12-05 NOTE — IP AVS SNAPSHOT
MRN:[id number]                      After Visit Summary   12/5/2018    Nerissa Sun    MRN: [id number]           Thank you!     Thank you for choosing [facility] for your care. Our goal is always to provide you with excellent care. Hearing back from our patients is one way we can continue to improve our services. Please take a few minutes to complete the written survey that you may receive in the mail after you visit. If you would like to speak to someone directly about your visit please contact Patient Relations at [phone]. Thank you!          Patient Information     Date Of Birth          1950        About your hospital stay     You were admitted on:  December 5, 2018 You last received care in the:  [facility] Observation Department    You were discharged on:  December 6, 2018        Reason for your hospital stay       You were in the hospital for episode of word finding difficulties and paresthesias due to TIA.                  Who to Call     For medical emergencies, please call 911.  For non-urgent questions about your medical care, please call your primary care provider or clinic, [phone]          Attending Provider     Provider Specialty    Chidi Purdy MD Emergency Medicine    Elder, Dalila Prado MD Internal Medicine       Primary Care Provider Office Phone # Fax #    Cyndi Rodriguez [phone]       When to contact your care team       Return to ED if you develop recurrent symptoms of word-finding difficulties, paresthesias, or weakness.                  After Care Instructions     Activity       Your activity upon discharge: activity as tolerated            Diet       Follow this diet upon discharge: Orders Placed This Encounter      Regular Diet Adult                  Follow-up Appointments     Follow-up and recommended labs and tests        Follow up with primary care provider, Cyndi Rodriguez, within 45  "days to review cardiac monitor results and repeat lipid panel (regardless of whether you start atorvastatin or not).                  Your next 10 appointments already scheduled     Mar 20, 2019 11:00 AM CDT   Return Sleep Patient with Deny Paulino MD   [facility] ([facility])    [address]  [facility] [id number] [phone]                         Pending Results     No orders found for last 3 day(s).            Statement of Approval     Ordered          12/06/18 8282  I have reviewed and agree with all the recommendations and orders detailed in this document.  EFFECTIVE NOW     Approved and electronically signed by:  Eloisa Naidu PA-C             Admission Information     Date & Time Provider Department Dept. Phone    12/5/2018 Dalila Elder MD [facility] Observation Department [phone]      Your Vitals Were     Blood Pressure Pulse Temperature Respirations Height Weight    115/62 (BP Location: Right arm) 58 98.5  F (36.9  C) (Oral) 16 1.6 m (5' 3\") 48.3 kg (106 lb 6.4 oz)    Pulse Oximetry BMI (Body Mass Index)                97% 18.85 kg/m2          [employer] Information     [email or web address] gives you secure access to your electronic health record. If you see a primary care provider, you can also send messages to your care team and make appointments. If you have questions, please call your primary care clinic.  If you do not have a primary care provider, please call [phone] and they will assist you.        Care EveryWhere ID     This is your Care EveryWhere ID. This could be used by other organizations to access your [place] medical records  [id number]        Equal Access to Services     [facility] LAZARO : Hadpito Gilman, waaarti luqadaha, qaybta kaallila garcia. So [facility] [phone].    ATENCIÓN: Si habla español, tiene a jensen disposición servicios gratuitos de " asistencia lingüística. Leoncio al [phone].    We comply with applicable federal civil rights laws and [place] laws. We do not discriminate on the basis of race, color, national origin, age, disability, sex, sexual orientation, or gender identity.               Review of your medicines      START taking        Dose / Directions    aspirin 81 MG EC tablet   Commonly known as:  ASA        Dose:  81 mg   Start taking on:  12/7/2018   Take 1 tablet (81 mg) by mouth daily   Refills:  0       atorvastatin 20 MG tablet   Commonly known as:  LIPITOR        Dose:  20 mg   Take 1 tablet (20 mg) by mouth every evening   Quantity:  30 tablet   Refills:  0         CONTINUE these medicines which have NOT CHANGED        Dose / Directions    Acidophilus Tabs   Used for:  Hypothyroid, Osteoporosis with pathological fracture, Osteoporosis, postmenopausal        Dose:  2 capsule   Take 2 capsules by mouth daily. 10 billion in 2 capsules   Refills:  0       buPROPion 150 MG 24 hr tablet   Commonly known as:  WELLBUTRIN XL        Dose:  450 mg   Take 450 mg by mouth every morning.   Refills:  0       calcium-magnesium 500-250 MG Tabs per tablet   Commonly known as:  CALMAG        Dose:  4 tablet   Take 4 tablets by mouth daily   Refills:  0       COLLAGEN PO        Dose:  1 teaspoonful   Take 1 teaspoonful by mouth daily   Refills:  0       diazepam 5 MG tablet   Commonly known as:  VALIUM        Dose:  2.5 mg   Take 2.5 mg by mouth 3 times daily as needed for anxiety   Refills:  0       levothyroxine 88 MCG tablet   Commonly known as:  SYNTHROID/LEVOTHROID        Dose:  88 mcg   Take 88 mcg by mouth every morning   Refills:  0       LIOTHYRONINE SODIUM PO        Dose:  5 mcg   Take 5 mcg by mouth 2 times daily.   Refills:  0       MIRALAX powder   Generic drug:  polyethylene glycol        Dose:  1 capful   Take 1 capful by mouth three times a week   Quantity:  510 g   Refills:  1       OMEGA-3 FISH OIL PO        Dose:  2 g   Take  2 g by mouth daily   Refills:  0       sucralfate 1 GM tablet   Commonly known as:  CARAFATE        Dose:  1 g   Take 1 g by mouth 3 times daily   Refills:  0       vitamin C 1000 MG Tabs   Commonly known as:  ASCORBIC ACID        Dose:  1000 mg   Take 1,000 mg by mouth daily   Refills:  0       vitamin D3 2000 units tablet   Commonly known as:  CHOLECALCIFEROL        Dose:  4000 Units   Take 4,000 Units by mouth daily   Refills:  0            Where to get your medicines      Some of these will need a paper prescription and others can be bought over the counter. Ask your nurse if you have questions.     Bring a paper prescription for each of these medications     atorvastatin 20 MG tablet       You don't need a prescription for these medications     aspirin 81 MG EC tablet                Protect others around you: Learn how to safely use, store and throw away your medicines at www.disposemymeds.org.             Medication List: This is a list of all your medications and when to take them. Check marks below indicate your daily home schedule. Keep this list as a reference.      Medications           Morning Afternoon Evening Bedtime As Needed    Acidophilus Tabs   Take 2 capsules by mouth daily. 10 billion in 2 capsules                                aspirin 81 MG EC tablet   Commonly known as:  ASA   Take 1 tablet (81 mg) by mouth daily   Start taking on:  12/7/2018   Last time this was given:  81 mg on 12/6/2018  7:52 AM                                atorvastatin 20 MG tablet   Commonly known as:  LIPITOR   Take 1 tablet (20 mg) by mouth every evening                                buPROPion 150 MG 24 hr tablet   Commonly known as:  WELLBUTRIN XL   Take 450 mg by mouth every morning.                                calcium-magnesium 500-250 MG Tabs per tablet   Commonly known as:  CALMAG   Take 4 tablets by mouth daily                                COLLAGEN PO   Take 1 teaspoonful by mouth daily                                 diazepam 5 MG tablet   Commonly known as:  VALIUM   Take 2.5 mg by mouth 3 times daily as needed for anxiety                                levothyroxine 88 MCG tablet   Commonly known as:  SYNTHROID/LEVOTHROID   Take 88 mcg by mouth every morning                                LIOTHYRONINE SODIUM PO   Take 5 mcg by mouth 2 times daily.                                MIRALAX powder   Take 1 capful by mouth three times a week   Generic drug:  polyethylene glycol                                OMEGA-3 FISH OIL PO   Take 2 g by mouth daily                                sucralfate 1 GM tablet   Commonly known as:  CARAFATE   Take 1 g by mouth 3 times daily                                vitamin C 1000 MG Tabs   Commonly known as:  ASCORBIC ACID   Take 1,000 mg by mouth daily                                vitamin D3 2000 units tablet   Commonly known as:  CHOLECALCIFEROL   Take 4,000 Units by mouth daily

## 2018-12-06 ENCOUNTER — APPOINTMENT (OUTPATIENT)
Dept: CARDIOLOGY | Facility: CLINIC | Age: 68
End: 2018-12-06
Attending: PHYSICIAN ASSISTANT
Payer: MEDICARE

## 2018-12-06 VITALS
DIASTOLIC BLOOD PRESSURE: 67 MMHG | RESPIRATION RATE: 12 BRPM | SYSTOLIC BLOOD PRESSURE: 102 MMHG | BODY MASS INDEX: 18.85 KG/M2 | HEART RATE: 58 BPM | TEMPERATURE: 96.2 F | WEIGHT: 106.4 LBS | OXYGEN SATURATION: 96 % | HEIGHT: 63 IN

## 2018-12-06 LAB
CHOLEST SERPL-MCNC: 216 MG/DL
GLUCOSE BLDC GLUCOMTR-MCNC: 140 MG/DL (ref 70–99)
GLUCOSE BLDC GLUCOMTR-MCNC: 92 MG/DL (ref 70–99)
HBA1C MFR BLD: 5.5 % (ref 0–5.6)
HDLC SERPL-MCNC: 60 MG/DL
INTERPRETATION ECG - MUSE: NORMAL
LDLC SERPL CALC-MCNC: 135 MG/DL
NONHDLC SERPL-MCNC: 156 MG/DL
TRIGL SERPL-MCNC: 104 MG/DL

## 2018-12-06 PROCEDURE — 93306 TTE W/DOPPLER COMPLETE: CPT

## 2018-12-06 PROCEDURE — A9270 NON-COVERED ITEM OR SERVICE: HCPCS | Mod: GY | Performed by: PHYSICIAN ASSISTANT

## 2018-12-06 PROCEDURE — 93306 TTE W/DOPPLER COMPLETE: CPT | Mod: 26 | Performed by: INTERNAL MEDICINE

## 2018-12-06 PROCEDURE — 93272 ECG/REVIEW INTERPRET ONLY: CPT | Mod: ZP | Performed by: INTERNAL MEDICINE

## 2018-12-06 PROCEDURE — 25000132 ZZH RX MED GY IP 250 OP 250 PS 637: Mod: GY | Performed by: PHYSICIAN ASSISTANT

## 2018-12-06 PROCEDURE — 93270 REMOTE 30 DAY ECG REV/REPORT: CPT | Performed by: PHYSICIAN ASSISTANT

## 2018-12-06 PROCEDURE — 99217 ZZC OBSERVATION CARE DISCHARGE: CPT | Performed by: PHYSICIAN ASSISTANT

## 2018-12-06 PROCEDURE — 00000146 ZZHCL STATISTIC GLUCOSE BY METER IP

## 2018-12-06 PROCEDURE — G0378 HOSPITAL OBSERVATION PER HR: HCPCS

## 2018-12-06 RX ORDER — ATORVASTATIN CALCIUM 20 MG/1
20 TABLET, FILM COATED ORAL EVERY EVENING
Qty: 30 TABLET | Refills: 0 | Status: SHIPPED | OUTPATIENT
Start: 2018-12-06

## 2018-12-06 RX ORDER — ATORVASTATIN CALCIUM 20 MG/1
20 TABLET, FILM COATED ORAL EVERY EVENING
Status: DISCONTINUED | OUTPATIENT
Start: 2018-12-06 | End: 2018-12-06 | Stop reason: HOSPADM

## 2018-12-06 RX ADMIN — ASPIRIN 81 MG: 81 TABLET, COATED ORAL at 07:52

## 2018-12-06 NOTE — PROGRESS NOTES
PRIMARY DIAGNOSIS: TIA  OUTPATIENT/OBSERVATION GOALS TO BE MET BEFORE DISCHARGE:  1. Orthostatic performed: N/A    2. Diagnostic testing complete & at baseline neurologic testing: No, scheduled MRI and Echo Bubble today    3. Cleared by consultants (if involved): NO, neurology consult    4. Interpretation of cardiac rhythm per telemetry tech: NSR HR 69    5. Tolerating adequate PO diet and medications: Yes    6. Return to near baseline physical activity or neurologic status: Yes    Patient alert and oriented.  Up independently in room.  Lung sounds clear.  Bowel sounds active.  Patient denies pain.  VSS.  Patient denies numbness and tingling to extremities.  Q4 neuro checks intact.  Will continue to monitor.      Discharge Planner Nurse   Safe discharge environment identified: Yes  Barriers to discharge: Yes       Entered by: Terese Harris 12/06/2018 1:34 PM     Please review provider order for any additional goals.   Nurse to notify provider when observation goals have been met and patient is ready for discharge.

## 2018-12-06 NOTE — H&P
[facility] Outpatient / Observation Unit  History and Physical Exam     Nerissa Sun MRN# [id number]   YOB: 1950 Age: 68 year old      Date of Admission:  12/5/2018    Primary care provider: Cyndi Rodriguez          Assessment   Nerissa Sun is a 68 year old female with a PMH significant for GERD, fibromyalgia, hypothyroidism and hx of alcohol abuse, who presented to the Emergency Room with complaints of difficulty finding words and right sided weakness concerning for Transient Ischemic Attack. Work up in the ED reveals: VSS. BMP and CBC are unremarkable. Troponin is <0.015. UA is unremarkable. EKG is SR. CT of the head was negative for any acute process. CTA is negative as well. Patient will be registered to Observation for further work-up and evaluation.     1. Concerns for TIA - episode of word finding difficulty yesterday that resolved, reoccurred today with right sided weakness, described as lightness of her right leg. Sx lasted ~45 min. Denied vision changes, slurred speech, or facial droop. CT and CTA were negative. Reports one similar episode of word finding difficulty ~1 yr ago that she related to stress or over-stimulation. Tele, MRI brain, echocardiogram and tele stroke consult. Start baby ASA  2. GERD - resume home meds  3. Fibromyalgia - resume home meds  4. Hypothyroidism - resume home meds         Plan     1. Registered to Observation  2. Continue telemetry monitoring   3. Start Aspirin EC 81 mg po daily  4. Neuro Checks Q4,  monitor BP  5. Obtain ECHO with bubble to rule out atrial clot and significant PFO  6. MRI scan, tele stroke Neurology consult  7. DVT prophylaxis: pt initiated on Aspirin EC 81 mg po daily , encourage ambulation                 Chief Complaint:   Possible TIA         History of Present Illness:   Nerissa Sun is a 68 year old female with a PMH significant for GERD, fibromyalgia, hypothyroidism and hx of alcohol abuse, who presented to the Emergency  Room with complaints of difficulty finding words and right sided weakness concerning for Transient Ischemic Attack. Pt reports an episode of difficulty with word finding yesterday that did resolve on its own. She notes a recurrent episode of this today associated with right sided weakness prompting her to come to the ED. She notes she was able to think of the word but was unable to formulate it in her mouth. She states the right sided sx were mostly in her right leg, noting it felt light. Today her sx lasted ~45 min before resolving. She notes one similar episode of word finding difficulties ~1 yr ago but attributes this to being over stimulated in a large group of people. She notes not tolerating large groups of people, loud noises or stress well. She notes her friend had surgery today due to stage IV cancer, which is particularly stressful for her. She endorses a very remote hx of migraine ha as a child.             Past Medical History:     Past Medical History:   Diagnosis Date     Basal Cell Carcinoma of Face  3/2/2010    several on face     Chronic pain     r/t fibromyalgia and chronic fatigue     Dysthymia      ETOH abuse     aftre divorce dry for several years      Fibromyalgia      Gastro-oesophageal reflux disease     not for past 3 mos     History of blood transfusion     1970's with tubal pregnancy     Hypothyroid      PONV (postoperative nausea and vomiting)                Past Surgical History:     Past Surgical History:   Procedure Laterality Date     APPENDECTOMY      28 yrs. old     CARPAL TUNNEL RELEASE RT/LT      Right     CARPAL TUNNEL RELEASE RT/LT  2013    left      CATARACT IOL, RT/LT  2010    bilat     COLONOSCOPY  5/21/2012    Procedure:COLONOSCOPY; COLONOSCOPY ; Surgeon:CHASTITY PEREZ MD; Location: GI     DILATION AND CURETTAGE  1976     x2     FRACTURE TX, WRIST RT/LT  4/2013    left, fell out of bed     GYN SURGERY      tubal pregnancy     HYSTERECTOMY, PAP NO LONGER INDICATED  1980      LASER CO2 TYMPANOMASTOIDECTOMY  2013    Procedure: LASER CO2 TYMPANOMASTOIDECTOMY;  Right Tympanoplasty Mastoidectomy with Co2 Laser ;  Surgeon: Deny Ziegler MD;  Location: [facility] OR     ORTHOPEDIC SURGERY      left rotator cuff repaIR     TYMPANOPLASTY  2013    Procedure: TYMPANOPLASTY;  Right Tympanoplasty with cartilage graft from the tragus and  TORP ossiculoplasty;  Surgeon: Deny Ziegler MD;  Location: [facility] OR               Social History:     Social History     Social History     Marital status:      Spouse name: N/A     Number of children: 1     Years of education: N/A     Occupational History     Psychologist None      On disabilty     Social History Main Topics     Smoking status: Former Smoker     Packs/day: 1.00     Years: 10.00     Quit date: 1990     Smokeless tobacco: Never Used     Alcohol use No     Drug use: No     Sexual activity: Not Currently     Other Topics Concern     Not on file     Social History Narrative               Family History:     Family History   Problem Relation Age of Onset     Cancer Mother       77yo Lung cancer, smoker     Heart Disease Mother      MI     Thyroid Disease Maternal Grandmother      Hyper     Thyroid Disease Maternal Aunt      Hypo     Thyroid Disease Brother      hypo     Thyroid Disease Mother      hypo     Alcohol/Drug Father      Breast Cancer Maternal Aunt      Cancer       ovarian maternal cousin      Cancer Maternal Grandfather      throat      Cerebrovascular Disease Maternal Aunt      Other - See Comments Mother      varicose veins      Other - See Comments Sister      varicose veins      Cancer Mother      lung      Cancer Maternal Grandmother      bone               Allergies:      Allergies   Allergen Reactions     Melatonin      N&V if takes more than 1.5 mg     Latex Rash               Medications:     Prior to Admission medications    Medication Sig Last Dose Taking? Auth Provider   ascorbic acid (VITAMIN C)  1000 MG TABS Take 1,000 mg by mouth   Reported, Patient   buPROPion (WELLBUTRIN XL) 150 MG 24 hr tablet Take 450 mg by mouth every morning.   Reported, Patient   Calcium Carbonate-Vit D-Min (CALCIUM 1200) 8347-7679 MG-UNIT CHEW Take by mouth 2 times daily Liquid- with magnesium   Savi You MD   cholecalciferol (VITAMIN D3) 5000 UNITS CAPS capsule Take 1 capsule (5,000 Units) by mouth daily   Nazia Chiu MD   COLLAGEN PO    Reported, Patient   Lactobacillus (ACIDOPHILUS) TABS Take 2 capsules by mouth daily. 10 billion in 2 capsules   Percy Goodman, NP   levothyroxine (SYNTHROID, LEVOTHROID) 112 MCG tablet Take  by mouth daily.   Reported, Patient   LIOTHYRONINE SODIUM PO Take 5 mcg by mouth 2 times daily.   Reported, Patient   magnesium citrate solution Drink 150mL PO once. If no BM in 8 hours, drink another 150mL PO   Deny Hill MD   MELATONIN 1 mg At Bedtime   Reported, Patient   methocarbamol (ROBAXIN) 750 MG tablet Take 1 tablet (750 mg) by mouth 4 times daily as needed for muscle spasms   Jaylen Latif MD   Omega-3 Fatty Acids (OMEGA-3 FISH OIL PO)    Reported, Patient   polyethylene glycol (MIRALAX) powder Take 17 g by mouth daily   Savi You MD   sucralfate (CARAFATE) 1 GM tablet Take by mouth as needed   Reported, Patient              Review of Systems:   A Comprehensive greater than 10 system review of systems was carried out.  Pertinent positives and negatives are noted above.  Otherwise negative for contributory information.     CV: NEGATIVE for chest pain, palpitations or peripheral edema  C: NEGATIVE for fever, chills, change in weight  E/M: NEGATIVE for ear, mouth and throat problems  R: NEGATIVE for significant cough or SOB    NEURO ROS: negative except for speech problems and weakness             Physical Exam:   Blood pressure 138/80, pulse 86, temperature 98.6  F (37  C), temperature source Oral, resp. rate 18, SpO2 97 %.    GENERAL:  Comfortable.  PSYCH:  pleasant, oriented, No acute distress.  HEENT:  Atraumatic, normocephalic. Normal conjunctiva, normal hearing, and oropharynx is normal.  NECK:  Supple, no neck vein distention.  HEART:  Normal S1, S2 with no murmur, no pericardial rub, gallops or S3 or S4.  LUNGS:  Clear to auscultation, normal Respiratory effort. No wheezing, rales or ronchi.  GI:  Soft, normal bowel sounds. Non-tender, non distended.   EXTREMITIES:  No pedal edema, +2 pulses bilateral and equal.  SKIN:  Dry to touch, No rash, wound or ulcerations.  NEUROLOGIC:  CN 2-12 intact, BL 5/5 symmetric upper and lower extremity strength, sensation is intact with no focal deficits. Normal finger nose finger           Data:     EKG demonstrates:  Sinus Rhythm, nonspecific T wave abnormality.      Recent Labs  Lab 12/05/18  1818   WBC 5.9   HGB 13.5   HCT 41.4   *          Recent Labs  Lab 12/05/18  1818      POTASSIUM 3.8   CHLORIDE 105   CO2 28   ANIONGAP 6   GLC 84   BUN 25   CR 1.00   GFRESTIMATED 55*   GFRESTBLACK 67   PACO 8.9       Recent Labs  Lab 12/05/18  1818   INR 0.96       Recent Labs  Lab 12/05/18  1818   TROPI <0.015       Recent Labs  Lab 12/05/18  1917   COLOR Colorless   APPEARANCE Clear   URINEGLC Negative   URINEBILI Negative   URINEKETONE Negative   SG 1.017   UBLD Negative   URINEPH 7.0   PROTEIN Negative   NITRITE Negative   LEUKEST Negative   RBCU <1   WBCU 1       Recent Results (from the past 48 hour(s))   CT Head w/o Contrast    Narrative    CT SCAN OF THE HEAD WITHOUT CONTRAST   12/5/2018 6:25 PM     HISTORY: Code stroke.     TECHNIQUE:  Axial images of the head and coronal reformations without  IV contrast material. Radiation dose for this scan was reduced using  automated exposure control, adjustment of the mA and/or kV according  to patient size, or iterative reconstruction technique.    COMPARISON: MRI 7/10/2014.    FINDINGS: The cerebral hemispheres, brainstem, and cerebellum  demonstrate normal morphology  and attenuation. No evidence of acute  ischemia, hemorrhage, mass, mass effect, or hydrocephalus.  Postoperative change of right canal wall down mastoidectomy are  demonstrated. Right temporal resonator device is noted. There is trace  opacification of the left mastoid cavity, likely inflammatory. The  calvarium, skull base, paranasal sinuses, and extracranial soft  tissues are otherwise unremarkable.      Impression    IMPRESSION: No evidence of acute ischemia or hemorrhage.    Findings were discussed by phone between Dr. Vasquez and Dr. Purdy  at 6:28 PM on 12/5/2018.    MEDARDO VASQUEZ MD   CTA Head Neck with Contrast    Narrative    CT ANGIOGRAM OF THE HEAD AND NECK WITH CONTRAST  12/5/2018 6:29 PM     HISTORY: CODE STROKE      TECHNIQUE: CT angiography with an injection of 70mL Isovue-370 IV with  scans through the head and neck. Images were transferred to a separate  3-D workstation where multiplanar reformations and 3-D images were  created. Estimates of carotid stenoses are made relative to the distal  internal carotid artery diameters except as noted. Radiation dose for  this scan was reduced using automated exposure control, adjustment of  the mA and/or kV according to patient size, or iterative  reconstruction technique.    COMPARISON: None.     CT ANGIOGRAM NECK FINDINGS: The bilateral common carotid, internal  carotid, external carotid, and vertebral arteries are patent. No  evidence of dissection, occlusion, or flow-limiting stenosis.     CT ANGIOGRAM HEAD FINDINGS: The intracranial vertebral arteries,  basilar artery, and posterior cerebral arteries are patent. The  internal carotid arteries, anterior cerebral arteries, middle cerebral  arteries are patent. No evidence of dissection, occlusion, or  flow-limiting stenosis. Dural venous sinuses are unremarkable. Left  transverse sinus is dominant.     There are degenerative changes of the cervical spine. There is a  multilevel moderate to severe  degenerative disc disease. There is  grade 1 anterolisthesis of C3 on C4.       Impression    IMPRESSION: Patent arteries in the head and neck. No evidence of  dissection, occlusion, or flow-limiting stenosis.    MEDARDO NAGY MD   CT Head Perfusion w Contrast    Narrative    CT BRAIN PERFUSION 12/5/2018 6:36 PM    HISTORY: Code Stroke. Evaluate mis-match between penumbra and core  infarct.     TECHNIQUE: Time sequential axial CT images of the head were acquired  during the administration of 50mL Isovue-370 IV. Color perfusion maps  of the brain were created from this time sequential axial source data.      Radiation dose for this scan was reduced using automated exposure  control, adjustment of the mA and/or kV according to patient size, or  iterative reconstruction technique.    COMPARISON: None.    FINDINGS: Transit time, flow, and volume maps demonstrate symmetric  perfusion bilaterally without focal perfusion deficit.      Impression    IMPRESSION: Normal perfusion CT scan of the brain.    MD Hollie WALKER PA-C

## 2018-12-06 NOTE — PLAN OF CARE
Problem: Patient Care Overview  Goal: Plan of Care/Patient Progress Review  Outcome: No Change  PRIMARY DIAGNOSIS: TIA  OUTPATIENT/OBSERVATION GOALS TO BE MET BEFORE DISCHARGE:  1. Orthostatic performed: No     2. Diagnostic testing complete & at baseline neurologic testing: Yes     3. Cleared by consultants (if involved): No     4. Interpretation of cardiac rhythm per telemetry tech: SR HR 60s     5. Tolerating adequate PO diet and medications: Yes     6. Return to near baseline physical activity or neurologic status: Yes     Patient alert and oriented x4. Denies numbness, tingling, pain, and slurred speech. Reports all previous symptoms have subsided. Neuros intact. Plan of care to consult with neurology and spiritual jorge and complete MRI and bubble ECHO. Declines pain and comfort interventions at this time. Patient is a standby assit. Tolerating regular diet. Patient lives alone in [place]; in [place] to visit friends. Patient resting comfortably in bed.     Discharge Planner Nurse   Safe discharge environment identified: Yes  Barriers to discharge: Yes; [place] out of state       Entered by: Vanessa Montilla 12/06/2018      Please review provider order for any additional goals.   Nurse to notify provider when observation goals have been met and patient is ready for discharge.

## 2018-12-06 NOTE — DISCHARGE SUMMARY
[facility]    Discharge Summary  Hospitalist    Date of Admission:  12/5/2018  Date of Discharge:  12/6/2018  Discharging Provider: Eloisa Naidu PA-C  Date of Service (when I saw the patient): 12/06/18    Discharge Diagnoses   TIA, concerning for left MCA distribution  Dyslipidemia  GERD  Fibromyalgia  Hypothyroidism    History of Present Illness   Nerissa Sun is an 68 year old female who presented with word finding difficulty and right sided weakness.     Hospital Course   Nerissa Sun was admitted on 12/5/2018.  The following problems were addressed during her hospitalization:    Active Problems:    TIA (transient ischemic attack)  Concerning for distribution in left MCA distribution.  Deficits lasted ~45 minutes with no recurrence while hospitalized.  No arrhythmia on telemetry.  CT and CTA without abnormalities.  Unable to obtain MRI due to cochlear implant.  TTE with bubble performed, no PFO noted.  Tele stroke recommended initiation of baby aspirin daily for stroke prevention as well as initiation of atorvastatin for goal LDL <100 (repeat LDL in 4-6 weeks to ensure not lower than 40).  Patient given prescription for atorvastatin, hesitant to start and will discuss with friends and PCP prior to discharge. A 30 day event monitor was placed at discharge to evaluate for atrial fibrillation.  May review results with primary care provider at follow-up.      Eloisa Naidu PA-C    Significant Results and Procedures   See below, no significant abnormalities on TTE, CT and CTA of head.     Pending Results   These results will be followed up by NA.  Please follow-up event monitor results with PCP.    Unresulted Labs Ordered in the Past 30 Days of this Admission     No orders found for last 61 day(s).          Code Status   Full Code       Primary Care Physician   Cyndi Braswell Guess    Physical Exam   Temp: 98.5  F (36.9  C) Temp src: Oral BP: 115/62 Pulse: 58 Heart Rate: 55 Resp: 16  SpO2: 97 % O2 Device: None (Room air)    Vitals:    12/05/18 2228   Weight: 48.3 kg (106 lb 6.4 oz)     Vital Signs with Ranges  Temp:  [97  F (36.1  C)-98.6  F (37  C)] 98.5  F (36.9  C)  Pulse:  [58-86] 58  Heart Rate:  [55-76] 55  Resp:  [16-18] 16  BP: (102-162)/(54-85) 115/62  SpO2:  [92 %-100 %] 97 %  I/O last 3 completed shifts:  In: -   Out: 125 [Urine:125]    Constitutional: Nontoxic appearing woman sitting up in bed.   Eyes: no icterus.   ENT: mucous membranes moist.   Respiratory: clear bilaterally  Cardiovascular: RRR, no murmurs  GI: normoactive bowel sounds, soft, nontender  Lymph/Hematologic: no bruising  Genitourinary: no catheter  Skin: warm and dry  Musculoskeletal: normal muscle bulk and tone  Neurologic: Nonfocal.  Normal strength throughout extremities.   Neuropsychiatric: alert, oriented, appropriate.     Discharge Disposition   Discharged to home  Condition at discharge: Stable    Consultations This Hospital Stay   NEUROLOGY IP CONSULT  SPIRITUAL HEALTH SERVICES IP CONSULT    Time Spent on this Encounter   I, Eloisa Naidu, personally saw the patient today and spent greater than 30 minutes discharging this patient.    Discharge Orders     Reason for your hospital stay   You were in the hospital for episode of word finding difficulties and paresthesias due to TIA.     Follow-up and recommended labs and tests    Follow up with primary care provider, Cyndi Rodriguez, within 45 days to review cardiac monitor results and repeat lipid panel (regardless of whether you start atorvastatin or not).     Activity   Your activity upon discharge: activity as tolerated     When to contact your care team   Return to ED if you develop recurrent symptoms of word-finding difficulties, paresthesias, or weakness.     Full Code     Diet   Follow this diet upon discharge: Orders Placed This Encounter     Regular Diet Adult       Discharge Medications   Current Discharge Medication List      START taking  these medications    Details   aspirin (ASA) 81 MG EC tablet Take 1 tablet (81 mg) by mouth daily    Associated Diagnoses: TIA (transient ischemic attack)      atorvastatin (LIPITOR) 20 MG tablet Take 1 tablet (20 mg) by mouth every evening  Qty: 30 tablet, Refills: 0    Associated Diagnoses: TIA (transient ischemic attack)         CONTINUE these medications which have NOT CHANGED    Details   ascorbic acid (VITAMIN C) 1000 MG TABS Take 1,000 mg by mouth daily       buPROPion (WELLBUTRIN XL) 150 MG 24 hr tablet Take 450 mg by mouth every morning.      calcium-magnesium (CALMAG) 500-250 MG TABS per tablet Take 4 tablets by mouth daily      COLLAGEN PO Take 1 teaspoonful by mouth daily       diazepam (VALIUM) 5 MG tablet Take 2.5 mg by mouth 3 times daily as needed for anxiety      Lactobacillus (ACIDOPHILUS) TABS Take 2 capsules by mouth daily. 10 billion in 2 capsules    Associated Diagnoses: Hypothyroid; Osteoporosis with pathological fracture; Osteoporosis, postmenopausal      levothyroxine (SYNTHROID/LEVOTHROID) 88 MCG tablet Take 88 mcg by mouth every morning      LIOTHYRONINE SODIUM PO Take 5 mcg by mouth 2 times daily.      Omega-3 Fatty Acids (OMEGA-3 FISH OIL PO) Take 2 g by mouth daily       polyethylene glycol (MIRALAX) powder Take 1 capful by mouth three times a week   Qty: 510 g, Refills: 1      sucralfate (CARAFATE) 1 GM tablet Take 1 g by mouth 3 times daily       vitamin D3 (CHOLECALCIFEROL) 2000 units tablet Take 4,000 Units by mouth daily           Allergies   Allergies   Allergen Reactions     Melatonin      N&V if takes more than 1.5 mg     Latex Rash     Data   Results for orders placed or performed during the hospital encounter of 12/05/18   CT Head w/o Contrast    Narrative    CT SCAN OF THE HEAD WITHOUT CONTRAST   12/5/2018 6:25 PM     HISTORY: Code stroke.     TECHNIQUE:  Axial images of the head and coronal reformations without  IV contrast material. Radiation dose for this scan was reduced  using  automated exposure control, adjustment of the mA and/or kV according  to patient size, or iterative reconstruction technique.    COMPARISON: MRI 7/10/2014.    FINDINGS: The cerebral hemispheres, brainstem, and cerebellum  demonstrate normal morphology and attenuation. No evidence of acute  ischemia, hemorrhage, mass, mass effect, or hydrocephalus.  Postoperative change of right canal wall down mastoidectomy are  demonstrated. Right temporal resonator device is noted. There is trace  opacification of the left mastoid cavity, likely inflammatory. The  calvarium, skull base, paranasal sinuses, and extracranial soft  tissues are otherwise unremarkable.      Impression    IMPRESSION: No evidence of acute ischemia or hemorrhage.    Findings were discussed by phone between Dr. Vasquez and Dr. Purdy  at 6:28 PM on 12/5/2018.    MEDARDO VASQUEZ MD   CTA Head Neck with Contrast    Narrative    CT ANGIOGRAM OF THE HEAD AND NECK WITH CONTRAST  12/5/2018 6:29 PM     HISTORY: CODE STROKE      TECHNIQUE: CT angiography with an injection of 70mL Isovue-370 IV with  scans through the head and neck. Images were transferred to a separate  3-D workstation where multiplanar reformations and 3-D images were  created. Estimates of carotid stenoses are made relative to the distal  internal carotid artery diameters except as noted. Radiation dose for  this scan was reduced using automated exposure control, adjustment of  the mA and/or kV according to patient size, or iterative  reconstruction technique.    COMPARISON: None.     CT ANGIOGRAM NECK FINDINGS: The bilateral common carotid, internal  carotid, external carotid, and vertebral arteries are patent. No  evidence of dissection, occlusion, or flow-limiting stenosis.     CT ANGIOGRAM HEAD FINDINGS: The intracranial vertebral arteries,  basilar artery, and posterior cerebral arteries are patent. The  internal carotid arteries, anterior cerebral arteries, middle cerebral  arteries  are patent. No evidence of dissection, occlusion, or  flow-limiting stenosis. Dural venous sinuses are unremarkable. Left  transverse sinus is dominant.     There are degenerative changes of the cervical spine. There is a  multilevel moderate to severe degenerative disc disease. There is  grade 1 anterolisthesis of C3 on C4.       Impression    IMPRESSION: Patent arteries in the head and neck. No evidence of  dissection, occlusion, or flow-limiting stenosis.    MEDARDO NAGY MD   CT Head Perfusion w Contrast    Narrative    CT BRAIN PERFUSION 12/5/2018 6:36 PM    HISTORY: Code Stroke. Evaluate mis-match between penumbra and core  infarct.     TECHNIQUE: Time sequential axial CT images of the head were acquired  during the administration of 50mL Isovue-370 IV. Color perfusion maps  of the brain were created from this time sequential axial source data.      Radiation dose for this scan was reduced using automated exposure  control, adjustment of the mA and/or kV according to patient size, or  iterative reconstruction technique.    COMPARISON: None.    FINDINGS: Transit time, flow, and volume maps demonstrate symmetric  perfusion bilaterally without focal perfusion deficit.      Impression    IMPRESSION: Normal perfusion CT scan of the brain.    MEDARDO NAGY MD

## 2018-12-06 NOTE — PHARMACY-ADMISSION MEDICATION HISTORY
Admission medication history interview status for this patient is complete. See [facility] admission navigator for allergy information, prior to admission medications and immunization status.     Medication history interview source(s):Patient  Medication history resources (including written lists, pill bottles, clinic record):[facility] List, Care Everywhere  Primary pharmacy:[facility]    Changes made to PTA medication list:  Added: Diazepam  Deleted: Magnesium, melatonin, robaxin  Changed: Calcium w/D to Calcium-mag, levothyroxine from 112mcg to 88mcg, vitamin D from 5000 to 4000    Actions taken by pharmacist (provider contacted, etc):None     Additional medication history information:None    Medication reconciliation/reorder completed by provider prior to medication history? No    Do you take OTC medications (eg tylenol, ibuprofen, fish oil, eye/ear drops, etc)? Y    Prior to Admission medications    Medication Sig Last Dose Taking? Auth Provider   ascorbic acid (VITAMIN C) 1000 MG TABS Take 1,000 mg by mouth daily  12/5/2018 at am Yes Reported, Patient   buPROPion (WELLBUTRIN XL) 150 MG 24 hr tablet Take 450 mg by mouth every morning. 12/5/2018 at am Yes Reported, Patient   calcium-magnesium (CALMAG) 500-250 MG TABS per tablet Take 4 tablets by mouth daily 12/5/2018 at am Yes Unknown, Entered By History   COLLAGEN PO Take 1 teaspoonful by mouth daily  12/5/2018 at am Yes Reported, Patient   diazepam (VALIUM) 5 MG tablet Take 2.5 mg by mouth 3 times daily as needed for anxiety 12/5/2018 at am Yes Unknown, Entered By History   Lactobacillus (ACIDOPHILUS) TABS Take 2 capsules by mouth daily. 10 billion in 2 capsules 12/5/2018 at am Yes Percy Goodman NP   levothyroxine (SYNTHROID/LEVOTHROID) 88 MCG tablet Take 88 mcg by mouth every morning 12/5/2018 at am Yes Unknown, Entered By History   LIOTHYRONINE SODIUM PO Take 5 mcg by mouth 2 times daily. 12/5/2018 at x2 Yes Reported, Patient   Omega-3 Fatty Acids  (OMEGA-3 FISH OIL PO) Take 2 g by mouth daily  12/5/2018 at am Yes Reported, Patient   polyethylene glycol (MIRALAX) powder Take 1 capful by mouth three times a week  12/5/2018 at Unknown time Yes Savi You MD   sucralfate (CARAFATE) 1 GM tablet Take 1 g by mouth 3 times daily  12/5/2018 at x2 Yes Reported, Patient   vitamin D3 (CHOLECALCIFEROL) 2000 units tablet Take 4,000 Units by mouth daily 12/5/2018 at am Yes Unknown, Entered By History

## 2018-12-06 NOTE — PLAN OF CARE
Problem: Patient Care Overview  Goal: Plan of Care/Patient Progress Review  PRIMARY DIAGNOSIS: TIA  OUTPATIENT/OBSERVATION GOALS TO BE MET BEFORE DISCHARGE:  1. Orthostatic performed: No    2. Diagnostic testing complete & at baseline neurologic testing: Yes    3. Cleared by consultants (if involved): No    4. Interpretation of cardiac rhythm per telemetry tech: SR HR 60s    5. Tolerating adequate PO diet and medications: Yes    6. Return to near baseline physical activity or neurologic status: Yes    Patient alert and oriented x4. Denies numbness, tingling, pain, and slurred speech. Reports all previous symptoms have subsided. Neuros intact. Plan of care to consult with neurology and spiritual jorge and complete MRI and bubble echo. Patient is a standby assit. Tolerating regular diet. Patient lives alone in [place]; in [place] to visit friends. Patient resting comfortably in bed.    Discharge Planner Nurse   Safe discharge environment identified: Yes  Barriers to discharge: Yes; [place] out of state       Entered by: Vanessa Montilla 12/06/2018    Please review provider order for any additional goals.   Nurse to notify provider when observation goals have been met and patient is ready for discharge.

## 2018-12-06 NOTE — PROGRESS NOTES
PRIMARY DIAGNOSIS: TIA  OUTPATIENT/OBSERVATION GOALS TO BE MET BEFORE DISCHARGE:  1. Orthostatic performed: N/A    2. Diagnostic testing complete & at baseline neurologic testing: No, scheduled MRI and Echo Bubble today    3. Cleared by consultants (if involved): NO, neurology consult    4. Interpretation of cardiac rhythm per telemetry tech: NSR HR 69    5. Tolerating adequate PO diet and medications: Yes    6. Return to near baseline physical activity or neurologic status: Yes    Discharge Planner Nurse   Safe discharge environment identified: Yes  Barriers to discharge: Yes       Entered by: Terese Harris 12/06/2018 8:11 AM     Please review provider order for any additional goals.   Nurse to notify provider when observation goals have been met and patient is ready for discharge.

## 2018-12-06 NOTE — PROGRESS NOTES
Patient called and I went to answer call light. Patient complains of feeling like head is muffled and a little tingling down left leg. Full neuro check done and Neuros are intact. Patient says she usually feels this way when she feels anxious. Patient says she normally takes valium for this. I will updated MD, and RN about this.

## 2018-12-06 NOTE — ED NOTES
Brought pt small drink of water as ok'd by Md ANDRZEJ waiting to hear from neurology, updated pt on point of care. No other complaints of requests.

## 2018-12-06 NOTE — ED TRIAGE NOTES
"Presents stating that at 7pm last evening ahd trouble \"finding her words\", today has had right arm tingling and right leg tingling that started at 515pm, finding words also started again at 515pm.  "

## 2018-12-06 NOTE — PROGRESS NOTES
"SPIRITUAL HEALTH SERVICES  SPIRITUAL ASSESSMENT Progress Note  [facility] Obs 2nd floor    PRIMARY FOCUS:     Symptom/pain management    Emotional/spiritual/Adventist distress    Support for coping    ILLNESS CIRCUMSTANCES:   Reviewed documentation. Reflective conversation shared with pt, \"Kiko\", which integrated elements of illness and family narratives.     Context of Serious Illness/Symptom(s) - Kiko shares that she suffered a TIA and is undergoing further testing.     Resources for Support - Kiko names her best friend, Neela.     DISTRESS:     Emotional/Existential/Relational Distress - Kiko shares the following:    Difficulty finding right balance in supporting her dtr and setting boundaries that don't enable her behaviors. Processed together and provided emotional support through validation/normalization of feelings.     Kiko's best friend, Neela, had recent cancer surgery and wishes to be there for her in her recovery.    Spiritual/Restorationist Distress - Kiko notes that, \"I'm angry at God, where is God in all of these things that are happening to me.\" Kiko notes that she feels \"tired and overwhelmed\" by life circumstances and she has little control over it all.      Social/Cultural/Economic Distress - Kiko shares she is \"as poor as a Sikhism mouse\" and finds this challenging.     SPIRITUAL/Mandaen COPING:     Denominational/Regla - Judaism with experiences in Reiki, Uatsdin, and other Eastern philosophies/practices.     Spiritual Practice(s) - Prayer, energy work, meditation.     Emotional/Existential/Relational Connections - Kiko enjoys nature (kayaking, the ocean) and the picture of the \"laughing Jonny.\" She notes she tends to be in relationships that are care giving and finds it hard to be a care .   GOALS OF CARE:    Goals of Care - Kiko hopes to get out of the hospital soon.     Meaning/Sense-Making - Kiko engaged in episodic life review sharing:    Growing up in a [employer] family and " "moving around the country a lot.    Her marriage and divorce.     Challenges with her dtr who journey's with addiction.     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     Wondering where God is in the midst of all of this.     Process the above providing supportive listening, emotional support. Shared poem entitled \"Just for Now\" by Sally Cardona which she wanted a copy of and reflected on meanings she saw in the poem about loving herself, being in the moment, and other themes.     PLAN: Pt hopes to discharge today or tomorrow. If she remains in the hospital past the weekend will f/u again early next week. Oriented pt on how to request  visit during her stay if desired.     Jesus Manuel Steen M.Div.  Staff   Pager [phone]  "

## 2018-12-06 NOTE — PLAN OF CARE
Problem: Patient Care Overview  Goal: Plan of Care/Patient Progress Review  ROOM # 206-1    Living Situation (if not independent, order SW consult):  Independently, in [place]  Facility name:  : [facility]    Activity level at baseline: Independent  Activity level on admit: SBA      Patient registered to observation; given Patient Bill of Rights; given the opportunity to ask questions about observation status and their plan of care.  Patient has been oriented to the observation room, bathroom and call light is in place.    Discussed discharge goals and expectations with patient/family.

## 2018-12-06 NOTE — ED NOTES
"[facility]  ED Nurse Handoff Report    Nerissa Sun is a 68 year old female   ED Chief complaint: Extremity Weakness  . ED Diagnosis:   Final diagnoses:   Right sided weakness   Difficulty with speech     Allergies:   Allergies   Allergen Reactions     Melatonin      N&V if takes more than 1.5 mg     Latex Rash       Code Status: Full Code  Activity level - Baseline/Home:  Independent. Activity Level - Current:   Independent. Lift room needed: No. Bariatric: No   Needed: No   Isolation: No. Infection: Not Applicable.     Vital Signs:   Vitals:    12/05/18 1945 12/05/18 2030 12/05/18 2045 12/05/18 2100   BP:  138/75  138/80   Pulse:       Resp:       Temp:       TempSrc:       SpO2: 98% 92% 97%        Cardiac Rhythm:  ,      Pain level: 0-10 Pain Scale: 0  Patient confused: No. Patient Falls Risk: Yes.   Elimination Status: Has voided   Patient Report - Initial Complaint: Weakness, sensation changes. Focused Assessment:      19:48 Neurological Cognitive - Cognitive/Neuro/Behavioral WDL: WDL Level Of Consciousness: alert Arousal Level: opens eyes spontaneously Orientation: oriented x 4 Follows Commands: yes Speech: clear Best Language: 0 - No aphasia  Quinton Coma Scale - Best Eye Response: 4-->(E4) spontaneous Best Motor Response: 6-->(M6) obeys commands Best Verbal Response: 5-->(V5) oriented Quinton Coma Scale Score: 15  Neuro - Additional Documentation:  (Pt reports resolution of symptoms at this time and according to previous RN. Denies tingling in R extremities, denies trouble \"finding words\", says she is speaking normally, no noted facial droop, strength equal bilaterally, ambulates independently. )  Pupils - Pupil PERRLA: yes  Motor Strength - Left Upper: 5 - active movement against gravity and full resistance Right Upper: 5 - active movement against gravity and full resistance Left Lower: 5 - active movement against gravity and full resistance Right Lower: 5 - active movement " against gravity and full resistance  Sensory Assessment - Sensation - All Extremities: no impairment       Tests Performed: EKG, CT, Labs. Abnormal Results:   Labs Ordered and Resulted from Time of ED Arrival Up to the Time of Departure from the ED   CBC WITH PLATELETS DIFFERENTIAL - Abnormal; Notable for the following:        Result Value     (*)     MCH 33.8 (*)     All other components within normal limits   BASIC METABOLIC PANEL - Abnormal; Notable for the following:     GFR Estimate 55 (*)     All other components within normal limits   INR   PARTIAL THROMBOPLASTIN TIME   TROPONIN I   ROUTINE UA WITH MICROSCOPIC   GLUCOSE BY METER     CTA Head Neck with Contrast   Final Result   IMPRESSION: Patent arteries in the head and neck. No evidence of   dissection, occlusion, or flow-limiting stenosis.      MEDARDO VASQUEZ MD      CT Head Perfusion w Contrast   Final Result   IMPRESSION: Normal perfusion CT scan of the brain.      MEDARDO VASQUEZ MD      CT Head w/o Contrast   Final Result   IMPRESSION: No evidence of acute ischemia or hemorrhage.      Findings were discussed by phone between Dr. Vasquez and Dr. Purdy   at 6:28 PM on 12/5/2018.      MEDARDO VASQUEZ MD      .   Treatments provided: Monitoring  Family Comments: No family at bedside   OBS brochure/video discussed/provided to patient:  N/A  ED Medications:   Medications   iopamidol (ISOVUE-370) solution 500 mL (120 mLs Intravenous Given 12/5/18 1820)   0.9% sodium chloride BOLUS (0 mLs Intravenous Stopped 12/5/18 1824)   ondansetron (ZOFRAN) 2 MG/ML injection (4 mg  Given 12/5/18 1848)     Drips infusing:  No  For the majority of the shift, the patient's behavior Green. Interventions performed were NA.     Severe Sepsis OR Septic Shock Diagnosis Present: No      ED Nurse Name/Phone Number: Yvon Ervin,   9:17 PM

## 2018-12-06 NOTE — CONSULTS
"[facility]      Stroke Consult Note    Reason for Consult:  Non-emergent consult request for \"TIA\"    HPI  Nerissa Sun is a 68 year old woman with a past medical history of GERD, fibromyalgia, hypothyroidism and alcohol abuse history. She presented to the ED with word finding problems and right side weakness lasting about 45 minutes. BP on presentation to the ED was 162/84. She was on aspirin 81mg at home but never on a statin.    Yesterday she was on a stationary bike, right leg felt weaker/light. Then had trouble getting words out, tongue felt thick, knew what she wanted to say but couldn't. The speech problem happened three times over half an hour little. Also had sensory change in right arm, perhaps a tingling, but right arm felt strong. Couldn't get words out briefly the night before either. A year ago was at an [employer] party and felt like she had a similar thing due to stress, but that was only a speech problem and was not accompanied by any tingling or weakness in any part of her body    She has no neurologic history including no prior stroke, no migraine.    TPA Treatment   Not given due to outside the time window, minor / isolated / quickly resolving stroke symptoms.    Endovascular Treatment  Not initiated due to absence of proximal vessel occlusion    Impression  Transient Ischemic Attack   The patient had symptoms concerning for left MCA distribution transient ischemic attack with a mild aphasia and right side weakness. She has no significant atherosclerosis on CTA suggesting atheroscloerosis as the etiology, however would recommend optimizing her stroke risk factors anyway (ie adding statin) until full workup complete (echo and tele).    TIA Evaluation Summarized  MRI and/or Head CT: CTA/CTA normal. MRI cannot  Intracranial Vascular Imaging: CTA no stenosis  Cervical Carotid and Vertebral Artery Vascular Imaging: CTA no stenosis  Echocardiogram: ordered, pending   EKG/Telemetry: sinus " rhythm  LDL: 135  A1c: 5.5      ABCD2 Patients Score   Age ? 60 years 1 point 1   Blood Pressure     -SBP ? 140 or DBP ?  90    1 point 1   Clinical Features    -Unilateral weakness   -Speech disturbance w/o           weakness    -Other    2 points  1 point    0 points 2   Duration of symptoms    ?60 minutes    10-59 minutes    <10 minutes   2 points  1 point  0 points 1   Diabetes  1 point 0   Patient s ABCD2 Score (0-7) = 5           Recommendations  Transient Ischemic Attack Recommendations  - ABCD2 Score: 5  - Neurochecks  - Daily aspirin 81mg for secondary stroke prevention  - Statin - recommend low dose for LDL goal <100. Follow up lipid panel with PCP in 4-6 weeks to assure LDL not below 40.  - TTE with Bubble Study - await result  - Recommend patient have 30 day outpatient telemetry monitoring upon leaving the hospital    Patient Follow-up    - final recommendation pending echo results      Please contact the Stroke Service with any questions.    Blanca Wallis PA-C  Neurology  12/06/2018 1:47 PM  Pager: [phone]  Text Page ([phone])  __________________________________________________    Past Medical History:   Diagnosis Date     Basal Cell Carcinoma of Face  3/2/2010    several on face     Chronic pain     r/t fibromyalgia and chronic fatigue     Dysthymia      ETOH abuse     aftre divorce dry for several years      Fibromyalgia      Gastro-oesophageal reflux disease     not for past 3 mos     History of blood transfusion     1970's with tubal pregnancy     Hypothyroid      PONV (postoperative nausea and vomiting)        Past Surgical History:   Procedure Laterality Date     APPENDECTOMY      28 yrs. old     CARPAL TUNNEL RELEASE RT/LT      Right     CARPAL TUNNEL RELEASE RT/LT  2013    left      CATARACT IOL, RT/LT  2010    bilat     COLONOSCOPY  5/21/2012    Procedure:COLONOSCOPY; COLONOSCOPY ; Surgeon:CHASTITY PEREZ MD; Location:[facility]     DILATION AND CURETTAGE  1976     x2     FRACTURE TX, WRIST RT/LT   4/2013    left, fell out of bed     GYN SURGERY      tubal pregnancy     HYSTERECTOMY, PAP NO LONGER INDICATED  1980     LASER CO2 TYMPANOMASTOIDECTOMY  4/12/2013    Procedure: LASER CO2 TYMPANOMASTOIDECTOMY;  Right Tympanoplasty Mastoidectomy with Co2 Laser ;  Surgeon: Deny Ziegler MD;  Location: [facility] OR     ORTHOPEDIC SURGERY      left rotator cuff repaIR     TYMPANOPLASTY  11/22/2013    Procedure: TYMPANOPLASTY;  Right Tympanoplasty with cartilage graft from the tragus and  TORP ossiculoplasty;  Surgeon: Deny Ziegler MD;  Location: [facility] OR       Medications   Medication Sig   ascorbic acid (VITAMIN C) 1000 MG TABS Take 1,000 mg by mouth daily    buPROPion (WELLBUTRIN XL) 150 MG 24 hr tablet Take 450 mg by mouth every morning.   calcium-magnesium (CALMAG) 500-250 MG TABS per tablet Take 4 tablets by mouth daily   COLLAGEN PO Take 1 teaspoonful by mouth daily    diazepam (VALIUM) 5 MG tablet Take 2.5 mg by mouth 3 times daily as needed for anxiety   Lactobacillus (ACIDOPHILUS) TABS Take 2 capsules by mouth daily. 10 billion in 2 capsules   levothyroxine (SYNTHROID/LEVOTHROID) 88 MCG tablet Take 88 mcg by mouth every morning   LIOTHYRONINE SODIUM PO Take 5 mcg by mouth 2 times daily.   Omega-3 Fatty Acids (OMEGA-3 FISH OIL PO) Take 2 g by mouth daily    polyethylene glycol (MIRALAX) powder Take 1 capful by mouth three times a week    sucralfate (CARAFATE) 1 GM tablet Take 1 g by mouth 3 times daily    vitamin D3 (CHOLECALCIFEROL) 2000 units tablet Take 4,000 Units by mouth daily         aspirin  81 mg Oral Daily     atorvastatin  20 mg Oral QPM     sodium chloride (PF)  3 mL Intracatheter Q8H         acetaminophen, lidocaine 4%, lidocaine (buffered or not buffered), melatonin, naloxone, ondansetron **OR** ondansetron, polyethylene glycol, senna-docusate **OR** senna-docusate, sodium chloride (PF)    Allergies   Allergies   Allergen Reactions     Melatonin      N&V if takes more than 1.5 mg     Latex Rash        Family History   Family History     Problem (# of Occurrences) Relation (Name,Age of Onset)    Alcohol/Drug (1) Father    Breast Cancer (1) Maternal Aunt    Cancer (5) Mother (`):  75yo Lung cancer, smoker, Maternal Grandfather: throat , Mother (`): lung , Maternal Grandmother: bone     Cerebrovascular Disease (1) Maternal Aunt    Heart Disease (1) Mother (`): MI    Other - See Comments (2) Mother (`): varicose veins , Sister: varicose veins     Thyroid Disease (4) Maternal Grandmother: Hyper, Maternal Aunt: Hypo, Brother: hypo, Mother (`): hypo          Social History   Social History     Social History     Marital status:      Spouse name: N/A     Number of children: 1     Years of education: N/A     Occupational History     Psychologist None      On disabilty     Social History Main Topics     Smoking status: Former Smoker     Packs/day: 1.00     Years: 10.00     Quit date: 1990     Smokeless tobacco: Never Used     Alcohol use No     Drug use: No     Sexual activity: Not Currently     Other Topics Concern     Not on file     Social History Narrative          ROS  The 10 point Review of Systems is negative other than noted in the HPI or here.     PHYSICAL EXAMINATION  Temp:  [97  F (36.1  C)-98.6  F (37  C)] 98.5  F (36.9  C)  Pulse:  [58-86] 58  Heart Rate:  [55-76] 55  Resp:  [16-18] 16  BP: (102-162)/(54-85) 115/62  SpO2:  [92 %-100 %] 97 %      Neurologic  Mental Status:  fully alert, attentive and oriented, follows commands, speech clear and fluent  Cranial Nerves:  visual fields intact, EOMI with normal smooth pursuit, facial sensation intact and symmetric, facial movements symmetric, hearing not formally tested but intact to conversation, no dysarthria, shoulder shrug strong bilaterally, tongue protrusion midline  Motor:  no pronator drift, normal and symmetric rapid finger tapping, able to move all limbs spontaneously, strength 5/5 throughout upper and lower  extremities  Reflexes:  unable to test (telestroke)  Sensory:  intact to LT without sensory neglect  Coordination:  FNF and HS intact without dysmetria  Station/Gait:  unable to test (telestroke)    Stroke Scales    NIHSS  Interval and Comments     1a. Level of Consciousness 0-->Alert: keenly responsive   1b. LOC Questions 0-->Answers both questions correctly   1c. LOC Commands 0-->Performs both tasks correctly   2.   Best Gaze 0-->Normal   3.   Visual 0-->No visual loss   4.   Facial Palsy 0-->Normal symmetrical movements   5a. Motor Arm, Left 0-->No drift: limb holds 90 (or 45) degrees for full 10 secs   5b. Motor Arm, Right 0-->No drift: limb holds 90 (or 45) degrees for full 10 secs   6a. Motor Leg, Left 0-->No drift: leg holds 30 degree position for full 5 secs   6b. Motor Leg, right 0-->No drift: leg holds 30 degree position for full 5 secs   7.   Limb Ataxia 0-->Absent   8.   Sensory 0-->Normal: no sensory loss   9.   Best Language 0-->No aphasia: normal   10. Dysarthria 0-->Normal   11. Extinction and Inattention  0-->No abnormality   Total 0       Labs/Imaging  Labs and imaging were reviewed and used in developing plan; pertinent results included.  Data   CBC  WBC (10e9/L)   Date Value   12/05/2018 5.9   12/11/2017 5.4   01/09/2015 4.7    RBC Count (10e12/L)   Date Value   12/05/2018 3.99   12/11/2017 4.13   01/09/2015 3.83    Hemoglobin (g/dL)   Date Value   12/05/2018 13.5   12/11/2017 14.0   01/09/2015 13.0      Hematocrit (%)   Date Value   12/05/2018 41.4   12/11/2017 41.6   01/09/2015 39.2    Platelet Count (10e9/L)   Date Value   12/05/2018 167   12/11/2017 153   01/09/2015 166         BMP  Sodium (mmol/L)   Date Value   12/05/2018 139   12/11/2017 138   01/09/2015 140    Potassium (mmol/L)   Date Value   12/05/2018 3.8   12/11/2017 3.4   01/09/2015 3.8    Chloride (mmol/L)   Date Value   12/05/2018 105   12/11/2017 103   01/09/2015 105      Carbon Dioxide (mmol/L)   Date Value   12/05/2018 28    12/11/2017 28   01/09/2015 30    Glucose (mg/dL)   Date Value   12/05/2018 84   12/11/2017 97   01/09/2015 93    Urea Nitrogen (mg/dL)   Date Value   12/05/2018 25   12/11/2017 28   01/09/2015 24      Creatinine (mg/dL)   Date Value   12/05/2018 1.00   12/11/2017 1.04   01/09/2015 0.80    Calcium (mg/dL)   Date Value   12/05/2018 8.9   12/11/2017 9.6   01/09/2015 9.1         INR Troponin I A1C   INR (no units)   Date Value   12/05/2018 0.96    Troponin I ES (ug/L)   Date Value   12/05/2018 <0.015   01/09/2015     <0.015  The 99th percentile for upper reference range is 0.045 ug/L.  Troponin values in   the range of 0.045 - 0.120 ug/L may be associated with risks of adverse   clinical events.   Effective 7/30/2014, the reference range for this assay has changed to reflect   new instrumentation/methodology.      Hemoglobin A1C (%)   Date Value   12/05/2018 5.5        Liver Panel  Protein Total (g/dL)   Date Value   01/09/2015 6.8   01/09/2015 6.5 (L)   03/07/2014 6.8    Albumin (g/dL)   Date Value   01/09/2015 3.8   01/09/2015 3.9   03/07/2014 4.0    Bilirubin Total (mg/dL)   Date Value   01/09/2015 0.3   01/09/2015 0.3   03/07/2014 0.4      Alkaline Phosphatase (U/L)   Date Value   01/09/2015 99   01/09/2015 83   03/07/2014 102    AST (U/L)   Date Value   01/09/2015 23   01/09/2015 23   03/07/2014 26    ALT (U/L)   Date Value   01/09/2015 41   01/09/2015 37   03/07/2014 35      No results found for: BILIDIRECT       Lipid Profile  Cholesterol (mg/dL)   Date Value   12/05/2018 216 (H)   05/25/2012 198   01/14/2010 210@    HDL Cholesterol (mg/dL)   Date Value   12/05/2018 60   05/25/2012 57   01/14/2010 59@    LDL Cholesterol Calculated (mg/dL)   Date Value   12/05/2018 135 (H)   05/25/2012 128   01/14/2010 137@      Triglycerides (mg/dL)   Date Value   12/05/2018 104   05/25/2012 67   01/14/2010 87@    Cholesterol/HDL Ratio (no units)   Date Value   05/25/2012 3.5              I have personally spent a total of 50  minutes providing care and consulting with this patient's medical providers today, with more than 50% of this time spent in consultation, coordination of care, and discussion with the patient and/or family regarding diagnostic results, prognosis, symptom management, risks and benefits of management options, and development of plan of care.     Telestroke Service Details  (for non-emergent stroke consult with tele)  Tele service began 12/06/18   1214   Tele service ended 12/06/18   1301   Type of service telemedicine diagnostic assessment of acute neurological changes   Reason telemedicine is appropriate patient requires assessment with a specialist for diagnosis and treatment of neurological symptoms   Mode of transmission secure interactive audio and video communication per [employer]   Originating site (patient location) [facility]    Distant site (provider location) [facility]

## 2018-12-06 NOTE — PLAN OF CARE
Problem: Patient Care Overview  Goal: Plan of Care/Patient Progress Review  Patient's After Visit Summary was reviewed with patient  Patient verbalized understanding of After Visit Summary, recommended follow up and was given an opportunity to ask questions.   Discharge medications sent home with patient/family: No, prescription given to patient for Atorvastatin  Discharged with  spouse

## 2018-12-08 ENCOUNTER — NURSE TRIAGE (OUTPATIENT)
Dept: NURSING | Facility: CLINIC | Age: 68
End: 2018-12-08

## 2018-12-08 ENCOUNTER — HOSPITAL ENCOUNTER (EMERGENCY)
Facility: CLINIC | Age: 68
Discharge: HOME | End: 2018-12-08
Attending: EMERGENCY MEDICINE | Admitting: EMERGENCY MEDICINE
Payer: MEDICARE

## 2018-12-08 VITALS
DIASTOLIC BLOOD PRESSURE: 81 MMHG | RESPIRATION RATE: 18 BRPM | BODY MASS INDEX: 18.78 KG/M2 | HEIGHT: 63 IN | WEIGHT: 106 LBS | SYSTOLIC BLOOD PRESSURE: 116 MMHG | HEART RATE: 64 BPM | TEMPERATURE: 98.1 F | OXYGEN SATURATION: 100 %

## 2018-12-08 DIAGNOSIS — R51.9 NONINTRACTABLE HEADACHE, UNSPECIFIED CHRONICITY PATTERN, UNSPECIFIED HEADACHE TYPE: ICD-10-CM

## 2018-12-08 DIAGNOSIS — R07.9 ACUTE CHEST PAIN: ICD-10-CM

## 2018-12-08 LAB — INTERPRETATION ECG - MUSE: NORMAL

## 2018-12-08 PROCEDURE — 93005 ELECTROCARDIOGRAM TRACING: CPT

## 2018-12-08 PROCEDURE — 99283 EMERGENCY DEPT VISIT LOW MDM: CPT

## 2018-12-08 RX ORDER — SODIUM CHLORIDE 9 MG/ML
1000 INJECTION, SOLUTION INTRAVENOUS CONTINUOUS
Status: DISCONTINUED | OUTPATIENT
Start: 2018-12-08 | End: 2018-12-08

## 2018-12-08 ASSESSMENT — ENCOUNTER SYMPTOMS
PALPITATIONS: 0
HEADACHES: 1
SHORTNESS OF BREATH: 0
FEVER: 0
NAUSEA: 1
DIZZINESS: 1
ABDOMINAL PAIN: 0

## 2018-12-08 NOTE — ED AVS SNAPSHOT
Emergency Department    [address] [facility] [address]    Phone:  [phone]    Fax:  [phone]                                       Nerissa Sun   MRN: [id number]    Department:   Emergency Department   Date of Visit:  12/8/2018           After Visit Summary Signature Page     I have received my discharge instructions, and my questions have been answered. I have discussed any challenges I see with this plan with the nurse or doctor.    ..........................................................................................................................................  Patient/Patient Representative Signature      ..........................................................................................................................................  Patient Representative Print Name and Relationship to Patient    ..................................................               ................................................  Date                                   Time    ..........................................................................................................................................  Reviewed by Signature/Title    ...................................................              ..............................................  Date                                               Time          22EPIC Rev 08/18

## 2018-12-08 NOTE — ED AVS SNAPSHOT
Emergency Department    [address] [facility] [address]    Phone:  [phone]    Fax:  [phone]                                       Nerissa Sun   MRN: [id number]    Department:   Emergency Department   Date of Visit:  12/8/2018           Patient Information     Date Of Birth          1950        Your diagnoses for this visit were:     Acute chest pain     Nonintractable headache, unspecified chronicity pattern, unspecified headache type        You were seen by Justice Conti MD.      Follow-up Information     Follow up with  Emergency Department.    Specialty:  EMERGENCY MEDICINE    Why:  As needed, If symptoms worsen    Contact information:    [address] [facility] [address] [phone]        Follow up with Cyndi Rodriguez, NP. Schedule an appointment as soon as possible for a visit in 2 days.    Specialty:  Nurse Practitioner    Contact information:    [facility]  [address]  [facility] [address]  [phone]          Discharge Instructions       Discharge Instructions  Chest Pain    You have been seen today for chest pain or discomfort.  At this time, your provider has found no signs that your chest pain is due to a serious or life-threatening condition, (or you have declined more testing and/or admission to the hospital). However, sometimes there is a serious problem that does not show up right away. Your evaluation today may not be complete and you may need further testing and evaluation.     Generally, every Emergency Department visit should have a follow-up clinic visit with either a primary or a specialty clinic/provider. Please follow-up as instructed by your emergency provider today.  Return to the Emergency Department if:    Your chest pain changes, gets worse, starts to happen more often, or comes with less activity.    You are newly short of breath.    You get very weak or tired.    You pass out or faint.    You have any new  symptoms, like fever, cough, numb legs, or you cough up blood.    You have anything else that worries you.    Until you follow-up with your regular provider, please do the following:    If you have questions, contact your regular provider.    Follow-up with your regular provider/clinic as directed; this is very important.    If you were given a prescription for medicine here today, be sure to read all of the information (including the package insert) that comes with your prescription.  This will include important information about the medicine, its side effects, and any warnings that you need to know about.  The pharmacist who fills the prescription can provide more information and answer questions you may have about the medicine.  If you have questions or concerns that the pharmacist cannot address, please call or return to the Emergency Department.       Remember that you can always come back to the Emergency Department if you are not able to see your regular provider in the amount of time listed above, if you get any new symptoms, or if there is anything that worries you.      Your next 10 appointments already scheduled     Mar 20, 2019 11:00 AM CDT   Return Sleep Patient with Deny Paulino MD   [facility] ([facility])    [address] [address] [phone]              24 Hour Appointment Hotline       To make an appointment at any [facility], call [phone] ([phone]). If you don't have a family doctor or clinic, we will help you find one. [facility] clinics are conveniently located to serve the needs of you and your family.             Review of your medicines      Our records show that you are taking the medicines listed below. If these are incorrect, please call your family doctor or clinic.        Dose / Directions Last dose taken    Acidophilus Tabs   Dose:  2 capsule        Take 2 capsules by mouth daily. 10  billion in 2 capsules   Refills:  0        aspirin 81 MG EC tablet   Commonly known as:  ASA   Dose:  81 mg        Take 1 tablet (81 mg) by mouth daily   Refills:  0        atorvastatin 20 MG tablet   Commonly known as:  LIPITOR   Dose:  20 mg   Quantity:  30 tablet        Take 1 tablet (20 mg) by mouth every evening   Refills:  0        buPROPion 150 MG 24 hr tablet   Commonly known as:  WELLBUTRIN XL   Dose:  450 mg        Take 450 mg by mouth every morning.   Refills:  0        calcium-magnesium 500-250 MG Tabs per tablet   Commonly known as:  CALMAG   Dose:  4 tablet        Take 4 tablets by mouth daily   Refills:  0        COLLAGEN PO   Dose:  1 teaspoonful        Take 1 teaspoonful by mouth daily   Refills:  0        diazepam 5 MG tablet   Commonly known as:  VALIUM   Dose:  2.5 mg        Take 2.5 mg by mouth 3 times daily as needed for anxiety   Refills:  0        levothyroxine 88 MCG tablet   Commonly known as:  SYNTHROID/LEVOTHROID   Dose:  88 mcg        Take 88 mcg by mouth every morning   Refills:  0        LIOTHYRONINE SODIUM PO   Dose:  5 mcg        Take 5 mcg by mouth 2 times daily.   Refills:  0        MIRALAX powder   Dose:  1 capful   Quantity:  510 g   Generic drug:  polyethylene glycol        Take 1 capful by mouth three times a week   Refills:  1        OMEGA-3 FISH OIL PO   Dose:  2 g        Take 2 g by mouth daily   Refills:  0        sucralfate 1 GM tablet   Commonly known as:  CARAFATE   Dose:  1 g        Take 1 g by mouth 3 times daily   Refills:  0        vitamin C 1000 MG Tabs   Commonly known as:  ASCORBIC ACID   Dose:  1000 mg        Take 1,000 mg by mouth daily   Refills:  0        vitamin D3 2000 units tablet   Commonly known as:  CHOLECALCIFEROL   Dose:  4000 Units        Take 4,000 Units by mouth daily   Refills:  0                Procedures and tests performed during your visit     EKG 12 lead      Orders Needing Specimen Collection     Ordered          12/08/18 1826  CBC with  platelets differential - STAT, Prio: STAT, Needs to be Collected     Scheduled Task Status   12/08/18 1826 Collect CBC with platelets differential Open   Order Class:  PCU Collect                12/08/18 1826  Basic metabolic panel - STAT, Prio: STAT, Needs to be Collected     Scheduled Task Status   12/08/18 1826 Collect Basic metabolic panel Open   Order Class:  PCU Collect                12/08/18 1826  Troponin I - STAT, Prio: STAT, Needs to be Collected     Scheduled Task Status   12/08/18 1826 Collect Troponin I Open   Order Class:  PCU Collect                  Pending Results     Date and Time Order Name Status Description    12/8/2018 1740 EKG 12 lead Preliminary             Pending Culture Results     No orders found from 12/6/2018 to 12/9/2018.            Pending Results Instructions     If you had any lab results that were not finalized at the time of your Discharge, you can call the ED Lab Result RN at [phone]. You will be contacted by this team for any positive Lab results or changes in treatment. The nurses are available 7 days a week from 10A to 6:30P.  You can leave a message 24 hours per day and they will return your call.        Test Results From Your Hospital Stay               Clinical Quality Measure: Blood Pressure Screening     Your blood pressure was checked while you were in the emergency department today. The last reading we obtained was  BP: 116/81 . Please read the guidelines below about what these numbers mean and what you should do about them.  If your systolic blood pressure (the top number) is less than 120 and your diastolic blood pressure (the bottom number) is less than 80, then your blood pressure is normal. There is nothing more that you need to do about it.  If your systolic blood pressure (the top number) is 120-139 or your diastolic blood pressure (the bottom number) is 80-89, your blood pressure may be higher than it should be. You should have your blood pressure  rechecked within a year by a primary care provider.  If your systolic blood pressure (the top number) is 140 or greater or your diastolic blood pressure (the bottom number) is 90 or greater, you may have high blood pressure. High blood pressure is treatable, but if left untreated over time it can put you at risk for heart attack, stroke, or kidney failure. You should have your blood pressure rechecked by a primary care provider within the next 4 weeks.  If your provider in the emergency department today gave you specific instructions to follow-up with your doctor or provider even sooner than that, you should follow that instruction and not wait for up to 4 weeks for your follow-up visit.        Thank you for choosing [facility]       Thank you for choosing [facility] for your care. Our goal is always to provide you with excellent care. Hearing back from our patients is one way we can continue to improve our services. Please take a few minutes to complete the written survey that you may receive in the mail after you visit with us. Thank you!        [employer] Information     [employer] gives you secure access to your electronic health record. If you see a primary care provider, you can also send messages to your care team and make appointments. If you have questions, please call your primary care clinic.  If you do not have a primary care provider, please call [phone] and they will assist you.        Care EveryWhere ID     This is your Care EveryWhere ID. This could be used by other organizations to access your [facility] medical records  [id number]        Equal Access to Services     DWAYNE WILLIS : Hadii alexander Gilman, sakshi sarah, qaybta lila carreon . So [facility] [phone].    ATENCIÓN: Si habla español, tiene a jensen disposición servicios gratuitos de asistencia lingüística. Leoncio al [phone].    We comply with applicable federal civil rights laws and  [place] laws. We do not discriminate on the basis of race, color, national origin, age, disability, sex, sexual orientation, or gender identity.            After Visit Summary       This is your record. Keep this with you and show to your community pharmacist(s) and doctor(s) at your next visit.

## 2018-12-08 NOTE — TELEPHONE ENCOUNTER
Patient says she has a heart monitor on and it was dinging.  Patient read pamphlet.  Patient says she has called several places and no one could give her an answer.  FNA advised ringing per booklet  Indicated information was transmitted but patient will have to wait for provider to review and interpret.  FNA advised to call back with any stroke symptoms.  Caller verbalizes understanding.    Additional Information    Negative: [1] Caller is not with the adult (patient) AND [2] reporting urgent symptoms    Negative: Lab result questions    Negative: Medication questions    Negative: Caller cannot be reached by phone    Negative: Caller has already spoken to PCP or another triager    Negative: RN needs further essential information from caller in order to complete triage    Negative: Requesting regular office appointment    Negative: [1] Caller requesting NON-URGENT health information AND [2] PCP's office is the best resource    Health Information question, no triage required and triager able to answer question    Protocols used: INFORMATION ONLY [facility]

## 2018-12-09 ENCOUNTER — NURSE TRIAGE (OUTPATIENT)
Dept: NURSING | Facility: CLINIC | Age: 68
End: 2018-12-09

## 2018-12-09 NOTE — DISCHARGE INSTRUCTIONS
Discharge Instructions  Chest Pain    You have been seen today for chest pain or discomfort.  At this time, your provider has found no signs that your chest pain is due to a serious or life-threatening condition, (or you have declined more testing and/or admission to the hospital). However, sometimes there is a serious problem that does not show up right away. Your evaluation today may not be complete and you may need further testing and evaluation.     Generally, every Emergency Department visit should have a follow-up clinic visit with either a primary or a specialty clinic/provider. Please follow-up as instructed by your emergency provider today.  Return to the Emergency Department if:    Your chest pain changes, gets worse, starts to happen more often, or comes with less activity.    You are newly short of breath.    You get very weak or tired.    You pass out or faint.    You have any new symptoms, like fever, cough, numb legs, or you cough up blood.    You have anything else that worries you.    Until you follow-up with your regular provider, please do the following:    If you have questions, contact your regular provider.    Follow-up with your regular provider/clinic as directed; this is very important.    If you were given a prescription for medicine here today, be sure to read all of the information (including the package insert) that comes with your prescription.  This will include important information about the medicine, its side effects, and any warnings that you need to know about.  The pharmacist who fills the prescription can provide more information and answer questions you may have about the medicine.  If you have questions or concerns that the pharmacist cannot address, please call or return to the Emergency Department.       Remember that you can always come back to the Emergency Department if you are not able to see your regular provider in the amount of time listed above, if you get any new  symptoms, or if there is anything that worries you.

## 2018-12-09 NOTE — TELEPHONE ENCOUNTER
"\"I need some information. I have been taking diazepam since 2007 or 2005 for depression and anxiety...\"   After this could no longer hear pt on the line. After 30 sec of this told pt cannot hear her, please call back again. Natalie Wilson RN/FNA'    "

## 2018-12-09 NOTE — TELEPHONE ENCOUNTER
Stopped Diazepam one week ago. Now having dizziness, nausea, heart racing.  Was in the ER last night with similar symptoms, but hadn't given them this information.  She wants to give this info to the ER and is asking if symptoms could be because she stopped this medication.  Contacted  ER. They recommended she come back to ER, which she will do.  Madelyn Loza RN  [facility]      Reason for Disposition    [1] Caller has URGENT question (includes prescribed medication questions) AND [2] triager unable to answer    Protocols used: RECENT MEDICAL VISIT FOR ILLNESS FOLLOW-UP CALL-ADULT-

## 2018-12-09 NOTE — ED PROVIDER NOTES
"  History     Chief Complaint:  Chest Pain    HPI   Nerissa Sun is a 68 year old female who presents with chest pain. Patient was discharged two days ago with concerns for a possible TIA. She was sent home with an event monitor. Patient states that yesterday she was feeling \"sea sick\" with dizziness and nausea. Patient states that she was feeling good today, but about 3 hours ago her event monitor started beeping, which worried her. She called the company and nurse hotline who suggest she come in for evaluation. She states that she started having chest heaviness at around 1500 while laying down watching TV. She states the pain has been decreasing. She states she started getting a headache about 30 minutes ago, which is starting to go away. Patient denies any trouble breathing, vision changes, and palpitations.     Allergies:  Melatonin  Oxycodone  Latex     Medications:    Ascorbic acid (vitamin c) 1000 mg tabs  Aspirin (asa) 81 mg ec tablet  Atorvastatin (lipitor) 20 mg tablet  Bupropion (wellbutrin xl) 150 mg 24 hr tablet  Calcium-magnesium (calmag) 500-250 mg tabs per tablet  Collagen po  Diazepam (valium) 5 mg tablet  Lactobacillus (acidophilus) tabs  Levothyroxine (synthroid/levothroid) 88 mcg tablet  Liothyronine sodium po  Omega-3 fatty acids (omega-3 fish oil po)  Polyethylene glycol (miralax) powder  Sucralfate (carafate) 1 gm tablet  Vitamin d3 (cholecalciferol) 2000 units tablet    Past Medical History:    Basal Cell Carcinoma of Face    Chronic pain   Dysthymia   ETOH abuse   Fibromyalgia   Gastro-oesophageal reflux disease   History of blood transfusion   Hypothyroid   PONV (postoperative nausea and vomiting)   Neutropenia  Osteoporosis  Vertigo  REM sleep behavior disorder  Post operative nausea and vomiting  Spondylolisthesis  Irritable bowel syndrome  TIA    Past Surgical History:    Appendectomy  Carpal tunnel release  Cataract IOL  GYN surgery  Hysterectomy  Orthopedic " "surgery  Tympanoplasty    Family History:    Cancer  Heart disease  Thyroid disease  Alcohol/drug     Social History:  Patient is   Tobacco Use: Former smoker  Alcohol Use: No  PCP: Cyndi Rodriguez     Review of Systems   Constitutional: Negative for fever.   Eyes: Negative for visual disturbance.   Respiratory: Negative for shortness of breath.    Cardiovascular: Positive for chest pain. Negative for palpitations.   Gastrointestinal: Positive for nausea. Negative for abdominal pain.   Neurological: Positive for dizziness and headaches.   All other systems reviewed and are negative.    Physical Exam   First Vitals:  Patient Vitals for the past 24 hrs:   BP Temp Temp src Pulse Heart Rate Resp SpO2 Height Weight   12/08/18 1824 - - - - 77 18 100 % - -   12/08/18 1737 116/81 98.1  F (36.7  C) Tympanic 64 64 16 100 % 1.6 m (5' 3\") 48.1 kg (106 lb)     Physical Exam  VS: Reviewed per above  HENT: Mucous membranes moist  EYES: conjunctiva anicteric  CV: Rate as noted, regular rhythm.   RESP: Effort normal. Breath sounds are normal bilaterally.  GI: no tenderness, not distended.  NEURO: Alert, moving all extremities. GCS 15  NEURO:   Mental status:  oriented to time, place, person  Cranial nerves:     Pupils equal and reactive, no discernable visual field cuts   Full extraocular movements   Facial muscles symmetric  Facial sensation symmetric and intact to light touch V1-V3  No dysarthria  Motor Strength:    BUE strength 5/5 with shoulder shrug, shoulder abduction, elbow flexion and extension, wrist flexion and extension, finger flexion and abduction.   BLE strength 5/5 with respect to hip flexion, knee flexion and extension, ankle plantarflexion and ankle dorsiflexion, great toe extension.  Sensation: intact sensation to light touch throughout BUE and BLE  Coordination:  finger-nose-finger intact, heel-shin intact, no ataxia  MSK: No deformity of the extremities  SKIN: Warm and dry    Emergency Department " Course   ECG:  @ 1747  Indication: Chest pain  Vent. Rate 56 bpm. DE interval 130 ms. QRS duration 92 ms. QT/QTc 430/414 ms. P-R-T axis 78 55 69.   Sinus bradycardia. Possible left atrial enlargement. Borderline ECG.  No significant change when compared to previous ECG from 12/5/18   Read @ 1816 by Dr. Conti.    Emergency Department Course:  Nursing notes and vitals reviewed.  I performed an exam of the patient as documented above.     Patient denied having any laboratory or imaging studies done.    Findings and plan explained to the patient. Patient discharged home with instructions regarding supportive care, medications, and reasons to return. The importance of close follow-up was reviewed.     Impression & Plan      Medical Decision Making:  Patient presents the ER with chest discomfort and headache.  Initial vital signs are within normal limits.  EKG did not reveal concerning ischemic changes.  After discussion I recommended obtaining lab work to further evaluate for ACS as well as x-ray imaging of the chest.  Patient decided that she was reassured by normal EKG and no longer wanted to receive evaluation in the ER. I explained to her the risks of missing an occult cardiac event which she understood.  As far as her event monitor, patient did not stay in the ER long enough for me to further investigate possible alarming of the machine.  With respect to her headache, it was not sudden in onset or maximal in onset.  I have a lower suspicion for occult aneurysmal bleed.  Patient has a completely normal neurologic examination without any symptoms to suggest TIA or CVA.  Patient understands to follow-up with her primary care doctor after the weekend.  Close return precautions were discussed.    Diagnosis:    ICD-10-CM    1. Acute chest pain R07.9    2. Nonintractable headache, unspecified chronicity pattern, unspecified headache type R51        Disposition:  discharged to home    I, Bradley Aasen, am serving as a  scribe on 12/8/2018 at 6:14 PM to personally document services performed by Justice Conti MD based on my observations and the provider's statements to me.        Justice Conti MD  12/08/18 1916

## 2019-11-08 ENCOUNTER — HEALTH MAINTENANCE LETTER (OUTPATIENT)
Age: 69
End: 2019-11-08

## 2020-02-23 ENCOUNTER — HEALTH MAINTENANCE LETTER (OUTPATIENT)
Age: 70
End: 2020-02-23

## 2020-12-06 ENCOUNTER — HEALTH MAINTENANCE LETTER (OUTPATIENT)
Age: 70
End: 2020-12-06

## 2020-12-13 NOTE — IP AVS SNAPSHOT
[facility] Observation Department    [address]    [facility] [address]    Phone:  [phone]                                       After Visit Summary   12/5/2018    Nerissa Sun    MRN: [id number]           After Visit Summary Signature Page     I have received my discharge instructions, and my questions have been answered. I have discussed any challenges I see with this plan with the nurse or doctor.    ..........................................................................................................................................  Patient/Patient Representative Signature      ..........................................................................................................................................  Patient Representative Print Name and Relationship to Patient    ..................................................               ................................................  Date                                   Time    ..........................................................................................................................................  Reviewed by Signature/Title    ...................................................              ..............................................  Date                                               Time          22EPIC Rev 08/18         Private car

## 2021-04-11 ENCOUNTER — HEALTH MAINTENANCE LETTER (OUTPATIENT)
Age: 71
End: 2021-04-11

## 2021-09-25 ENCOUNTER — HEALTH MAINTENANCE LETTER (OUTPATIENT)
Age: 71
End: 2021-09-25

## 2021-11-20 ENCOUNTER — HEALTH MAINTENANCE LETTER (OUTPATIENT)
Age: 71
End: 2021-11-20

## 2022-01-27 ENCOUNTER — TELEPHONE (OUTPATIENT)
Dept: SLEEP MEDICINE | Facility: CLINIC | Age: 72
End: 2022-01-27
Payer: MEDICARE

## 2022-01-27 NOTE — TELEPHONE ENCOUNTER
73 yo female (birthday today) with dream enactment.  We have not seen her for 3 years and she is wondering about treatment options.  Limits on what we can do over the phone and I emphasized she needs to establish care with a  Sleep provider.  She is currently living near [place], [place] and will look at sleep centers there.     In the meanwhile I explained our previous advise which was increasing doses of melatonin starting at three milligrams.     She indicated she would do that and reach out and find a sleep provider.

## 2022-01-27 NOTE — TELEPHONE ENCOUNTER
Reason for Call:  Other other    Detailed comments: patient called and lives in [facility].  Patient is no longer established with [facility].  Patient wants to know if Dr Paulino can contact her on questions she has without an appointment.  Please contact patient.  Thank you.    Phone Number Patient can be reached at: Home number on file [phone] (home)    Best Time: any    Can we leave a detailed message on this number? YES    Call taken on 1/27/2022 at 10:37 AM by Luisana Jaeger

## 2022-05-07 ENCOUNTER — HEALTH MAINTENANCE LETTER (OUTPATIENT)
Age: 72
End: 2022-05-07

## 2022-05-16 ENCOUNTER — TELEPHONE (OUTPATIENT)
Dept: SLEEP MEDICINE | Facility: CLINIC | Age: 72
End: 2022-05-16
Payer: MEDICARE

## 2022-05-16 NOTE — TELEPHONE ENCOUNTER
Reason for call:  Other   Patient called regarding (reason for call): call back  Additional comments: Patient is requesting a callback to discuss treatment options for her REM disorder. Patient did not want to schedule an appointment and wanted a callback from Dr. Paulino    Phone number to reach patient:  Cell number on file:    Telephone Information:   Mobile [phone]       Best Time:  any    Can we leave a detailed message on this number?  YES    Travel screening: Negative

## 2022-05-17 NOTE — TELEPHONE ENCOUNTER
Called patient to discuss additional information.     The patient states back last summer she was having a really bad dream and she never woke up and threw a light pole against the wall and put a whole in it.     The patient reports increased situations / dreams where she remembers she leaves bed and crawls back into bed over the last several days. She is sore but notes no bruises.     Patient states she screams, yells, laughs, cries and shouts in her sleep. This has been happening more regularly.    No changes in any medications that patient notes.    The patient is very concerned and would like Dr. Paulino to address these concerns.     Chandni De La Vega RN on 5/17/2022 at 10:45 AM

## 2022-05-17 NOTE — TELEPHONE ENCOUNTER
Called patient and left voicemail with recommendation from Dr. Paulino.     Patient should follow-up with Dr. Paulino for a visit or establish care with sleep center closer to her home.     Chandni De La Vega RN on 5/17/2022 at 1:06 PM

## 2023-04-22 ENCOUNTER — HEALTH MAINTENANCE LETTER (OUTPATIENT)
Age: 73
End: 2023-04-22

## 2023-06-02 ENCOUNTER — HEALTH MAINTENANCE LETTER (OUTPATIENT)
Age: 73
End: 2023-06-02

## 2023-12-02 ENCOUNTER — HEALTH MAINTENANCE LETTER (OUTPATIENT)
Age: 73
End: 2023-12-02